# Patient Record
Sex: MALE | Race: WHITE | Employment: OTHER | URBAN - METROPOLITAN AREA
[De-identification: names, ages, dates, MRNs, and addresses within clinical notes are randomized per-mention and may not be internally consistent; named-entity substitution may affect disease eponyms.]

---

## 2015-05-04 LAB — EJECTION FRACTION: 50

## 2017-09-26 ENCOUNTER — PRE VISIT (OUTPATIENT)
Dept: UROLOGY | Facility: CLINIC | Age: 82
End: 2017-09-26

## 2017-10-04 ENCOUNTER — PRE VISIT (OUTPATIENT)
Dept: UROLOGY | Facility: CLINIC | Age: 82
End: 2017-10-04

## 2017-10-04 ASSESSMENT — ENCOUNTER SYMPTOMS
DOUBLE VISION: 0
RECTAL BLEEDING: 0
VOMITING: 0
BOWEL INCONTINENCE: 0
BLOOD IN STOOL: 0
SKIN CHANGES: 0
EYE REDNESS: 0
NAIL CHANGES: 0
JAUNDICE: 0
EYE IRRITATION: 1
CONSTIPATION: 1
DYSURIA: 0
ABDOMINAL PAIN: 0
DIARRHEA: 0
POOR WOUND HEALING: 0
EYE PAIN: 0
HEMATURIA: 0
BLOATING: 0
HEARTBURN: 0
NAUSEA: 0
RECTAL PAIN: 0
EYE WATERING: 1
DIFFICULTY URINATING: 1
FLANK PAIN: 0

## 2017-10-04 NOTE — TELEPHONE ENCOUNTER
Patient with a history of incontinence and AUS coming in because his AUS isn't working like it used to. Chart reviewed and pt had surgery with Dr. Pérez in 2010. Pt will bring 24 hours worth of pads for a pad weight.

## 2017-10-09 ENCOUNTER — OFFICE VISIT (OUTPATIENT)
Dept: UROLOGY | Facility: CLINIC | Age: 82
End: 2017-10-09

## 2017-10-09 VITALS
SYSTOLIC BLOOD PRESSURE: 142 MMHG | WEIGHT: 214.9 LBS | DIASTOLIC BLOOD PRESSURE: 79 MMHG | BODY MASS INDEX: 29.11 KG/M2 | HEART RATE: 83 BPM | HEIGHT: 72 IN

## 2017-10-09 DIAGNOSIS — Z96.0 STATUS POST IMPLANTATION OF ARTIFICIAL URINARY SPHINCTER: ICD-10-CM

## 2017-10-09 DIAGNOSIS — N39.3 MALE URINARY STRESS INCONTINENCE: Primary | ICD-10-CM

## 2017-10-09 RX ORDER — LEVOTHYROXINE SODIUM 100 UG/1
100 TABLET ORAL EVERY MORNING
COMMUNITY

## 2017-10-09 RX ORDER — POTASSIUM CITRATE 15 MEQ/1
1 TABLET, EXTENDED RELEASE ORAL 2 TIMES DAILY
COMMUNITY
Start: 2016-09-28

## 2017-10-09 RX ORDER — MUPIROCIN 20 MG/G
OINTMENT TOPICAL PRN
COMMUNITY
Start: 2017-01-17 | End: 2018-03-06

## 2017-10-09 RX ORDER — BISACODYL 5 MG
15 TABLET, DELAYED RELEASE (ENTERIC COATED) ORAL
COMMUNITY

## 2017-10-09 RX ORDER — AMLODIPINE BESYLATE 5 MG/1
5 TABLET ORAL EVERY MORNING
COMMUNITY

## 2017-10-09 RX ORDER — CLOPIDOGREL BISULFATE 75 MG/1
75 TABLET ORAL EVERY MORNING
Status: ON HOLD | COMMUNITY
End: 2017-12-21

## 2017-10-09 RX ORDER — AMLODIPINE AND VALSARTAN 5; 160 MG/1; MG/1
TABLET ORAL
COMMUNITY
End: 2017-12-14

## 2017-10-09 RX ORDER — CLONAZEPAM 1 MG/1
.5-1 TABLET ORAL AT BEDTIME
COMMUNITY

## 2017-10-09 RX ORDER — ROSUVASTATIN CALCIUM 10 MG/1
10 TABLET, COATED ORAL EVERY MORNING
COMMUNITY

## 2017-10-09 RX ORDER — DOXYCYCLINE HYCLATE 100 MG/1
100 TABLET, DELAYED RELEASE ORAL 2 TIMES DAILY
COMMUNITY

## 2017-10-09 ASSESSMENT — PAIN SCALES - GENERAL: PAINLEVEL: NO PAIN (0)

## 2017-10-09 NOTE — NURSING NOTE
Chief Complaint   Patient presents with     Consult     Patient with a history of incontinence and AUS coming in because his AUS isn't working like it used to. pt had surgery with Dr. Pérez in 2010. Pt will bring 24 hours worth of pads for a pad weight.     Deyanira Ratliff

## 2017-10-09 NOTE — LETTER
10/9/2017       RE: Uday Montilla  [address] [place] [address]     Dear Colleague,    Thank you for referring your patient, Uday Montilla, to the [facility] UROLOGY AND INST FOR PROSTATE AND UROLOGIC CANCERS at [facility]. Please see a copy of my visit note below.    NEW PATIENT FOR MALE URINARY INCONTINENCE    Name: Uday Montilla    MRN: [id number]   YOB: 1935                 Chief Complaint:   Urinary Incontinence          History of Present Illness:   Mr. Uday Montilla is a 82 year old male 7 years s/p AUS for urinary incontinence after radical prostatectomy and external beam radiation therapy presenting today for urinary incontinence. Incontinence is mixed. He manages the incontinence with 3-4 large #6 pads per day. Patient does not need to void by Crede or straining. He is not dry at night. He can voluntarily interrupt his stream when urinating in a toilet. Activities which exacerbate incontinence include golf. There is not a history of bladder neck contracture. His expectations for incontinence after treatment with us are: used to wear 1 pad a day after initial surgery.    Last 1.5 years, he reports persistent leaking throughout the day. Number of pads has increased to 3-4 up from approximately 1 a day. Even when he is sitting down the patient will leak a large enough amount of urine to fill a pad. Does not feel the urge to go to the bathroom at that time.    01/2010 Procedure: Transparent perineal placement of an  artificial urinary sphincter (4-cm cuff and 61-79 cm of water pressure-regulating balloon)    Questionnaires:    IIQ-7: 1) 1; 2) 3; 3) 3; 4) 1; 5) 3; 6) 1; 7) 3; Total) 15, 5  CATHLEEN: 1)4; 2)3, 3)4, 4)0, 5)2, 6)1, 7)3; 8)4; 9)3, 10)3; Total Severity) 21; Total Bother)  6         Past Medical History:   Hypertension  Hyperlipidemia  Diabetes Mellitus Type 2  Hypothroidism         Past Surgical  History:   Radical prostatectomy  External beam radiation  01/2010 AUS          Social History:     Social History   Substance Use Topics     Smoking status: Not on file     Smokeless tobacco: Not on file     Alcohol use Not on file            Family History:   No family history on file.         Allergies:    No Known Allergies           Medications:     Current Outpatient Prescriptions   Medication     amLODIPine (NORVASC) 5 MG tablet     bisacodyl (DULCOLAX) 5 MG EC tablet     clonazePAM (KLONOPIN) 1 MG tablet     rosuvastatin (CRESTOR) 10 MG tablet     potassium citrate 15 MEQ (1620 MG) TBCR     mupirocin (BACTROBAN) 2 % ointment     metFORMIN (GLUCOPHAGE) 500 MG tablet     levothyroxine (SYNTHROID/LEVOTHROID) 100 MCG tablet     Doxycycline Hyclate 100 MG TBEC     clopidogrel (PLAVIX) 75 MG tablet     Amlodipine Besylate-Valsartan 5-160 MG TABS     No current facility-administered medications for this visit.               Review of Systems:    ROS: 14 point ROS neg other than the symptoms noted above in the HPI.          Physical Exam:   /79  Pulse 83  Ht 1.829 m (6')  Wt 97.5 kg (214 lb 14.4 oz)  BMI 29.15 kg/m2  Estimated body mass index is 29.15 kg/(m^2) as calculated from the following:    Height as of this encounter: 1.829 m (6').    Weight as of this encounter: 97.5 kg (214 lb 14.4 oz).  General: age-appropriate appearing male in NAD. Overweight body habitus.  HEENT: Head AT/NC, EOMI, CN Grossly intact  Resp: no respiratory distress, lung sounds clear.  CV: heart rate regular, S1, S2.  Lymph: No cervical, supraclavicular or axillary lymphadenopathy  Back: bony spine is non-tender, flanks are nontender  Abdomen: not obese, soft, non-distended, non-tender. No organomegaly  : Uncircumcised male; AUS pump is present in the scrotum.  Rectal exam: Deferred  LE: no edema.   Neuro: grossly intact  Motor: excellent strength throughout  Skin: clear of rashes or ecchymoses.        Labs:    All laboratory  data reviewed with patient        Office Studies:           Imaging:    All imaging reviewed with patient.        Outside records:    I spent 10 minutes reviewing outside records.         Assessment and Plan:   82 year old male with urinary incontinence s/p radical prostatectomy and external beam radiation with AUS in place 2010. Plan is for cystoscopy.    Explained to the patient that at 8 years only 50% of patients dry with an AUS device. We discussed that it could be a device malfunction or more likely suburethral atrophy. He states that he would like to have a revision if necessary. The patient would not like to continue observation because his symptoms are causing a significant burden on his quality of life.    No orders of the defined types were placed in this encounter.      F/U: cystoscopy    Patient was seen and examined with Dr. Beto Eastman MS4  As the medical student, I acted as a scribe for this note. All portions of the documented history and physical were personally performed by Dr. Pérez as the attending physician.     =======================================================  As the attending surgeon I, Blane Pérez, interviewed and examined the patient. The plan was developed between me and the patient. My findings and plan are as stated by the resident.    Blane Pérez MD

## 2017-10-09 NOTE — PROGRESS NOTES
NEW PATIENT FOR MALE URINARY INCONTINENCE    Name: Uday Montilla    MRN: [id number]   YOB: 1935                 Chief Complaint:   Urinary Incontinence          History of Present Illness:   Mr. Uday Montilla is a 82 year old male 7 years s/p AUS for urinary incontinence after radical prostatectomy and external beam radiation therapy presenting today for urinary incontinence. Incontinence is mixed. He manages the incontinence with 3-4 large #6 pads per day. Patient does not need to void by Crede or straining. He is not dry at night. He can voluntarily interrupt his stream when urinating in a toilet. Activities which exacerbate incontinence include golf. There is not a history of bladder neck contracture. His expectations for incontinence after treatment with us are: used to wear 1 pad a day after initial surgery.    Last 1.5 years, he reports persistent leaking throughout the day. Number of pads has increased to 3-4 up from approximately 1 a day. Even when he is sitting down the patient will leak a large enough amount of urine to fill a pad. Does not feel the urge to go to the bathroom at that time.    01/2010 Procedure: Transparent perineal placement of an  artificial urinary sphincter (4-cm cuff and 61-79 cm of water pressure-regulating balloon)    Questionnaires:    IIQ-7: 1) 1; 2) 3; 3) 3; 4) 1; 5) 3; 6) 1; 7) 3; Total) 15, 5  CATHLEEN: 1)4; 2)3, 3)4, 4)0, 5)2, 6)1, 7)3; 8)4; 9)3, 10)3; Total Severity) 21; Total Bother)  6         Past Medical History:   Hypertension  Hyperlipidemia  Diabetes Mellitus Type 2  Hypothroidism         Past Surgical History:   Radical prostatectomy  External beam radiation  01/2010 AUS          Social History:     Social History   Substance Use Topics     Smoking status: Not on file     Smokeless tobacco: Not on file     Alcohol use Not on file            Family History:   No family history on file.         Allergies:    No Known Allergies            Medications:     Current Outpatient Prescriptions   Medication     amLODIPine (NORVASC) 5 MG tablet     bisacodyl (DULCOLAX) 5 MG EC tablet     clonazePAM (KLONOPIN) 1 MG tablet     rosuvastatin (CRESTOR) 10 MG tablet     potassium citrate 15 MEQ (1620 MG) TBCR     mupirocin (BACTROBAN) 2 % ointment     metFORMIN (GLUCOPHAGE) 500 MG tablet     levothyroxine (SYNTHROID/LEVOTHROID) 100 MCG tablet     Doxycycline Hyclate 100 MG TBEC     clopidogrel (PLAVIX) 75 MG tablet     Amlodipine Besylate-Valsartan 5-160 MG TABS     No current facility-administered medications for this visit.               Review of Systems:    ROS: 14 point ROS neg other than the symptoms noted above in the HPI.          Physical Exam:   /79  Pulse 83  Ht 1.829 m (6')  Wt 97.5 kg (214 lb 14.4 oz)  BMI 29.15 kg/m2  Estimated body mass index is 29.15 kg/(m^2) as calculated from the following:    Height as of this encounter: 1.829 m (6').    Weight as of this encounter: 97.5 kg (214 lb 14.4 oz).  General: age-appropriate appearing male in NAD. Overweight body habitus.  HEENT: Head AT/NC, EOMI, CN Grossly intact  Resp: no respiratory distress, lung sounds clear.  CV: heart rate regular, S1, S2.  Lymph: No cervical, supraclavicular or axillary lymphadenopathy  Back: bony spine is non-tender, flanks are nontender  Abdomen: not obese, soft, non-distended, non-tender. No organomegaly  : Uncircumcised male; AUS pump is present in the scrotum.  Rectal exam: Deferred  LE: no edema.   Neuro: grossly intact  Motor: excellent strength throughout  Skin: clear of rashes or ecchymoses.        Labs:    All laboratory data reviewed with patient        Office Studies:           Imaging:    All imaging reviewed with patient.        Outside records:    I spent 10 minutes reviewing outside records.         Assessment and Plan:   82 year old male with urinary incontinence s/p radical prostatectomy and external beam radiation with AUS in place 2010. Plan is  for cystoscopy.    Explained to the patient that at 8 years only 50% of patients dry with an AUS device. We discussed that it could be a device malfunction or more likely suburethral atrophy. He states that he would like to have a revision if necessary. The patient would not like to continue observation because his symptoms are causing a significant burden on his quality of life.    No orders of the defined types were placed in this encounter.      F/U: cystoscopy    Patient was seen and examined with Dr. Beto Eastman MS4  As the medical student, I acted as a scribe for this note. All portions of the documented history and physical were personally performed by Dr. Pérez as the attending physician.     =======================================================  As the attending surgeon I, Blane Pérez, interviewed and examined the patient. The plan was developed between me and the patient. My findings and plan are as stated by the resident.    Blane Pérez MD         Answers for HPI/ROS submitted by the patient on 10/4/2017   General Symptoms: No  Skin Symptoms: Yes  HENT Symptoms: No  EYE SYMPTOMS: Yes  HEART SYMPTOMS: No  LUNG SYMPTOMS: No  INTESTINAL SYMPTOMS: Yes  URINARY SYMPTOMS: Yes  REPRODUCTIVE SYMPTOMS: Yes  SKELETAL SYMPTOMS: No  BLOOD SYMPTOMS: No  NERVOUS SYSTEM SYMPTOMS: No  MENTAL HEALTH SYMPTOMS: No  Changes in hair: No  Changes in moles/birth marks: No  Itching: No  Rashes: No  Changes in nails: No  Acne: Yes  Change in facial hair: No  Warts: No  Non-healing sores: No  Scarring: No  Flaking of skin: No  Color changes of hands/feet in cold : No  Sun sensitivity: Yes  Skin thickening: No  Eye pain: No  Vision loss: No  Dry eyes: Yes  Watery eyes: Yes  Eye bulging: No  Double vision: No  Flashing of lights: No  Spots: No  Floaters: No  Redness: No  Crossed eyes: No  Tunnel Vision: No  Yellowing of eyes: No  Eye irritation: Yes  Heart burn or indigestion: No  Nausea: No  Vomiting:  No  Abdominal pain: No  Bloating: No  Constipation: Yes  Diarrhea: No  Blood in stool: No  Black stools: No  Rectal or Anal pain: No  Fecal incontinence: No  Rectal bleeding: No  Yellowing of skin or eyes: No  Vomit with blood: No  Change in stools: No  Hemorrhoids: No  Trouble holding urine or incontinence: Yes  Pain or burning: No  Trouble starting or stopping: Yes  Increased frequency of urination: No  Blood in urine: No  Decreased frequency of urination: No  Frequent nighttime urination: Yes  Flank pain: No  Difficulty emptying bladder: Yes  Scrotal pain or swelling: No  Erectile dysfunction: Yes  Penile discharge: Yes  Genital ulcers: No  Reduced libido: No

## 2017-10-09 NOTE — MR AVS SNAPSHOT
After Visit Summary   10/9/2017    Uday Montilla    MRN: [id number]           Patient Information     Date Of Birth          1935        Visit Information        Provider Department      10/9/2017 7:00 AM Blane Pérez MD [facility] Urology and [facility] for Prostate and Urologic Cancers        Care Instructions    Return for a cystoscopy.    It was a pleasure meeting with you today.  Thank you for allowing me and my team the privilege of caring for you today.  YOU are the reason we are here, and I truly hope we provided you with the excellent service you deserve.  Please let us know if there is anything else we can do for you so that we can be sure you are leaving completely satisfied with your care experience.                  Follow-ups after your visit        Your next 10 appointments already scheduled     Nov 20, 2017  7:30 AM CST   (Arrive by 7:15 AM)   Cystoscopy with Blane Pérez MD   [facility] Urology and [facility] for Prostate and Urologic Cancers ([facility] [place])    [address] [place] [id number] [phone]              Who to contact     Please call your clinic at [phone] to:    Ask questions about your health    Make or cancel appointments    Discuss your medicines    Learn about your test results    Speak to your doctor   If you have compliments or concerns about an experience at your clinic, or if you wish to file a complaint, please contact [facility] Patient Relations at [phone] or email us at [email or web address]         Additional Information About Your Visit        MyChart Information     [employer] gives you secure access to your electronic health record. If you see a primary care provider, you can also send messages to your care team and make appointments. If you have questions, please call your primary care clinic.  If you do not have a primary care provider, please  call [phone] and they will assist you.      [employer] is an electronic gateway that provides easy, online access to your medical records. With [employer], you can request a clinic appointment, read your test results, renew a prescription or communicate with your care team.     To access your existing account, please contact your [facility] or call [phone] for assistance.        Care EveryWhere ID     This is your Care EveryWhere ID. This could be used by other organizations to access your [facility] medical records  [id number]        Your Vitals Were     Pulse Height BMI (Body Mass Index)             83 1.829 m (6') 29.15 kg/m2          Blood Pressure from Last 3 Encounters:   10/09/17 142/79    Weight from Last 3 Encounters:   10/09/17 97.5 kg (214 lb 14.4 oz)              Today, you had the following     No orders found for display       Primary Care Provider    None Specified       No primary provider on file.        Equal Access to Services     GUS SANDY : Hadii lala perdomoo Sobonnie, waaxda luqadaha, qaybta kaalmada adeanya, elaine ibrahim . So [facility] [phone].    ATENCIÓN: Si habla español, tiene a alvarez disposición servicios gratuitos de asistencia lingüística. Phurehan al [phone].    We comply with applicable federal civil rights laws and [place] laws. We do not discriminate on the basis of race, color, national origin, age, disability, sex, sexual orientation, or gender identity.            Thank you!     Thank you for choosing [facility] UROLOGY AND [facility] FOR PROSTATE AND UROLOGIC CANCERS  for your care. Our goal is always to provide you with excellent care. Hearing back from our patients is one way we can continue to improve our services. Please take a few minutes to complete the written survey that you may receive in the mail after your visit with us. Thank you!             Your Updated Medication List - Protect others around you: Learn  how to safely use, store and throw away your medicines at www.disposemymeds.org.          This list is accurate as of: 10/9/17  8:08 AM.  Always use your most recent med list.                   Brand Name Dispense Instructions for use Diagnosis    amLODIPine 5 MG tablet    NORVASC     Take 5 mg by mouth        Amlodipine Besylate-Valsartan 5-160 MG Tabs           bisacodyl 5 MG EC tablet           clonazePAM 1 MG tablet    klonoPIN     Take 0.5-1 mg by mouth        clopidogrel 75 MG tablet    PLAVIX          Doxycycline Hyclate 100 MG Tbec           levothyroxine 100 MCG tablet    SYNTHROID/LEVOTHROID     Take 100 mcg by mouth        metFORMIN 500 MG tablet    GLUCOPHAGE          mupirocin 2 % ointment    BACTROBAN          potassium citrate 15 MEQ (1620 MG) Tbcr           rosuvastatin 10 MG tablet    CRESTOR

## 2017-11-13 ENCOUNTER — PRE VISIT (OUTPATIENT)
Dept: UROLOGY | Facility: CLINIC | Age: 82
End: 2017-11-13

## 2017-11-13 NOTE — TELEPHONE ENCOUNTER
Patient with incontinence coming in for a cystoscopy to evaluate urethra for an AUS. Chart reviewed and all records available. No need for a call.

## 2017-11-20 ENCOUNTER — OFFICE VISIT (OUTPATIENT)
Dept: UROLOGY | Facility: CLINIC | Age: 82
End: 2017-11-20

## 2017-11-20 ENCOUNTER — ALLIED HEALTH/NURSE VISIT (OUTPATIENT)
Dept: UROLOGY | Facility: CLINIC | Age: 82
End: 2017-11-20

## 2017-11-20 VITALS
HEART RATE: 54 BPM | BODY MASS INDEX: 29.85 KG/M2 | HEIGHT: 72 IN | SYSTOLIC BLOOD PRESSURE: 146 MMHG | WEIGHT: 220.4 LBS | DIASTOLIC BLOOD PRESSURE: 79 MMHG

## 2017-11-20 DIAGNOSIS — N39.3 MALE URINARY STRESS INCONTINENCE: Primary | ICD-10-CM

## 2017-11-20 ASSESSMENT — PAIN SCALES - GENERAL
PAINLEVEL: NO PAIN (0)
PAINLEVEL: NO PAIN (0)

## 2017-11-20 NOTE — NURSING NOTE
Chief Complaint   Patient presents with     Cystoscopy     Patient with incontinence coming in for a cystoscopy to evaluate urethra for an AUS.      Deyanira Ratliff

## 2017-11-20 NOTE — LETTER
11/20/2017     RE: Uday Montilla  [address] [place] [address]     Dear Colleague,    Thank you for referring your patient, Uday Montilla, to the [facility] UROLOGY AND INST FOR PROSTATE AND UROLOGIC CANCERS at [facility]. Please see a copy of my visit note below.    PRE-PROCEDURE DIAGNOSIS: male stress urinary incontinence   POST-PROCEDURE DIAGNOSIS: same  PROCEDURE: Cystoscopy  HISTORY: Uday Montilla is a 82 year old male with RP+EBRT, AUS (4cm cuff) in 2010 who was dry for 6 years and now has 1.5 years of return of incontinence.  REVIEW OF OFFICE STUDIES (e.g., uroflow or PVR): none  DESCRIPTION OF PROCEDURE: After informed consent was obtained, the patient was brought to the procedure room where he was placed in the supine position with all pressure points well padded.  The penis and scrotum were prepped and draped in a sterile fashion. A flexible cystoscope was introduced through a well-lubricated urethra. Anterior urethra strictures were absent.   The AUS cuff coapted 80-90%. The cuff was cycled and there was no e/o erosion. There was a 2cm segment of urethral proximal to the cuff.   The native urinary sphincter was very weak.  The prostate demonstrated removal.  Bladder neck was open.   Bladder signififcant for  the following:      Diverticuli: absent      Cellules: absent      Trabeculation: absent      Tumors: absent      Stones: absent  The flexible cystoscope was removed and the findings were described to the patient.   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 Plan for removal and replacement of all components. Pump is currently on left but he would be fine with it on right. Will have to scope intra-op after exposing the urethra to decide on best location -- more proximal or distal. Will do this at [place] OR b/c he is over 81 y/o and may have to stay o/n due to increased pain with removal and  replacement.  He understands the risks to include but not be limited to bleeding, infection, penile or scrotal pain/numbness, AUS infection, cuff erosion, urinary retention, sub-suff atrophy, persistent incontinence and need for additional procedures. He understands there is a 25-40% 10-year revision rate. He understands I consult for AMS. He wishes to proceed.     Again, thank you for allowing me to participate in the care of your patient.      Sincerely,    Blane Pérez MD

## 2017-11-20 NOTE — NURSING NOTE
Invasive Procedure Safety Checklist:    Procedure:     Action: Complete sections and checkboxes as appropriate.    Pre-procedure:  1. Patient ID Verified with 2 identifiers (Kaleigh and  or MRN) : YES    2. Procedure and site verified with patient/designee (when able) : YES    3. Accurate consent documentation in medical record : YES    4. H&P (or appropriate assessment) documented in medical record : YES  H&P must be up to 30 days prior to procedure an updated within 24 hours of                 Procedure as applicable.     5. Relevant diagnostic and radiology test results appropriately labeled and displayed as applicable : YES    6. Blood products, implants, devices, and/or special equipment available for the procedure as applicable : YES    7. Procedure site(s) marked with provider initials [Exclusions: N/A] : NO    8. Marking not required. Reason : Yes  Procedure does not require site marking    Time Out:     Time-Out performed immediately prior to starting procedure, including verbal and active participation of all team members addressing: YES      1. Correct patient identity.  2. Confirmed that the correct side and site are marked.  3. An accurate procedure to be done.  4. Agreement on the procedure to be done.  5. Correct patient position.  6. Relevant images and results are properly labeled and appropriately displayed.  7. The need to administer antibiotics or fluids for irrigation purposes during the procedure as applicable.  8. Safety precautions based on patient history or medication use.    During Procedure: Verification of correct person, site, and procedure occurs any time the responsibility for care of the patient is transferred to another member of the care team.

## 2017-11-20 NOTE — PROGRESS NOTES
PRE-PROCEDURE DIAGNOSIS: male stress urinary incontinence   POST-PROCEDURE DIAGNOSIS: same  PROCEDURE: Cystoscopy  HISTORY: Uday Montilla is a 82 year old male with RP+EBRT, AUS (4cm cuff) in 2010 who was dry for 6 years and now has 1.5 years of return of incontinence.  REVIEW OF OFFICE STUDIES (e.g., uroflow or PVR): none  DESCRIPTION OF PROCEDURE: After informed consent was obtained, the patient was brought to the procedure room where he was placed in the supine position with all pressure points well padded.  The penis and scrotum were prepped and draped in a sterile fashion. A flexible cystoscope was introduced through a well-lubricated urethra. Anterior urethra strictures were absent.   The AUS cuff coapted 80-90%. The cuff was cycled and there was no e/o erosion. There was a 2cm segment of urethral proximal to the cuff.   The native urinary sphincter was very weak.  The prostate demonstrated removal.  Bladder neck was open.   Bladder signififcant for  the following:      Diverticuli: absent      Cellules: absent      Trabeculation: absent      Tumors: absent      Stones: absent  The flexible cystoscope was removed and the findings were described to the patient.   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 Plan for removal and replacement of all components. Pump is currently on left but he would be fine with it on right. Will have to scope intra-op after exposing the urethra to decide on best location -- more proximal or distal. Will do this at [place] b/c he is over 81 y/o and may have to stay o/n due to increased pain with removal and replacement.  He understands the risks to include but not be limited to bleeding, infection, penile or scrotal pain/numbness, AUS infection, cuff erosion, urinary retention, sub-suff atrophy, persistent incontinence and need for additional procedures. He understands there is a 25-40% 10-year revision rate. He understands I consult for  AMS. He wishes to proceed.

## 2017-11-20 NOTE — MR AVS SNAPSHOT
"              After Visit Summary   11/20/2017    Uday Montilla    MRN: [id number]           Patient Information     Date Of Birth          1935        Visit Information        Provider Department      11/20/2017 7:30 AM Blane Pérez MD [facility] Urology and [facility] for Prostate and Urologic Cancers        Today's Diagnoses     Male urinary stress incontinence    -  1      Care Instructions      AFTER YOUR CYSTOSCOPY        You have just completed a cystoscopy, or \"cysto\", which allowed your physician to learn more about your bladder (or to remove a stent placed after surgery). We suggest that you continue to avoid caffeine, fruit juice, and alcohol for the next 24 hours, however, you are encouraged to return to your normal activities.         A few things that are considered normal after your cystoscopy:     * Small amount of bleeding (or spotting) that clears within the next 24 hours     * Slight burning sensation with urination     * Sensation to of needing to avoid more frequently     * The feeling of \"air\" in your urine     * Mild discomfort that is relieved with Tylenol        Please contact our office promptly if you:     * Develop a fever above 101 degrees     * Are unable to urinate     * Develop bright red blood that does not stop     * Severe pain or swelling         Please contact our office with any concerns or questions @[facility].          Follow-ups after your visit        Follow-up notes from your care team     Return in 12 months (on 11/20/2018).      Who to contact     Please call your clinic at [phone] to:    Ask questions about your health    Make or cancel appointments    Discuss your medicines    Learn about your test results    Speak to your doctor   If you have compliments or concerns about an experience at your clinic, or if you wish to file a complaint, please contact [facility] Patient Relations at [phone] or email us at " [email or web address]         Additional Information About Your Visit        Yovigoharmiguel Information     Yovigohart gives you secure access to your electronic health record. If you see a primary care provider, you can also send messages to your care team and make appointments. If you have questions, please call your primary care clinic.  If you do not have a primary care provider, please call [phone] and they will assist you.      [employer]PP is an electronic gateway that provides easy, online access to your medical records. With REPP, you can request a clinic appointment, read your test results, renew a prescription or communicate with your care team.     To access your existing account, please contact your [facility] or call [phone] for assistance.        Care EveryWhere ID     This is your Care EveryWhere ID. This could be used by other organizations to access your [place] medical records  [id number]        Your Vitals Were     Pulse Height BMI (Body Mass Index)             54 1.829 m (6') 29.89 kg/m2          Blood Pressure from Last 3 Encounters:   11/20/17 146/79   10/09/17 142/79    Weight from Last 3 Encounters:   11/20/17 100 kg (220 lb 6.4 oz)   10/09/17 97.5 kg (214 lb 14.4 oz)              We Performed the Following     CYSTOURETHROSCOPY     Maranda-Operative Worksheet  (Urology General)        Primary Care Provider    None Specified       No primary provider on file.        Equal Access to Services     GUS SANDY : Hadradha Leavitt, cordell best, qapatsyta elaine stockton . So [facility] [phone].    ATENCIÓN: Si habla español, tiene a alvarez disposición servicios gratuitos de asistencia lingüística. Johnnie al [phone].    We comply with applicable federal civil rights laws and [place] laws. We do not discriminate on the basis of race, color, national origin, age, disability, sex, sexual orientation,  or gender identity.            Thank you!     Thank you for choosing [facility] UROLOGY AND [facility] [[email or web address]] FOR PROSTATE AND UROLOGIC CANCERS  for your care. Our goal is always to provide you with excellent care. Hearing back from our patients is one way we can continue to improve our services. Please take a few minutes to complete the written survey that you may receive in the mail after your visit with us. Thank you!             Your Updated Medication List - Protect others around you: Learn how to safely use, store and throw away your medicines at www.disposemymeds.org.          This list is accurate as of: 11/20/17  7:39 AM.  Always use your most recent med list.                   Brand Name Dispense Instructions for use Diagnosis    amLODIPine 5 MG tablet    NORVASC     Take 5 mg by mouth        Amlodipine Besylate-Valsartan 5-160 MG Tabs           bisacodyl 5 MG EC tablet           clonazePAM 1 MG tablet    klonoPIN     Take 0.5-1 mg by mouth        clopidogrel 75 MG tablet    PLAVIX          Doxycycline Hyclate 100 MG Tbec           levothyroxine 100 MCG tablet    SYNTHROID/LEVOTHROID     Take 100 mcg by mouth        metFORMIN 500 MG tablet    GLUCOPHAGE          mupirocin 2 % ointment    BACTROBAN          potassium citrate 15 MEQ (1620 MG) Tbcr           rosuvastatin 10 MG tablet    CRESTOR

## 2017-11-20 NOTE — PROGRESS NOTES
Pre Op Teaching Flowsheet       Pre and Post op Patient Education  Relevant Diagnosis:  Male stress urinary incontinence  Teaching Topic:  Pre and post op teaching for removal and replacement of artifical urinary sphincter, cystoscopy  Person Involved in teaching:  dUay Montilla      Motivation Level:  Asks Questions: Yes  Eager to Learn:  Yes  Cooperative: Yes  Receptive (willing/able to accept information):  Yes  Patient demonstrates understanding of the following:  Date and time of surgery:  12.21.17 at 1245  Location of surgery: [address] [place] [address]  History and Physical and any other testing necessary prior to surgery: Yes  Required time line for completion of History and Physical and any pre-op testing: Yes    NPO Guidelines: NPO per Anesthesia Guidelines    Patient demonstrates understanding of the following:  Pre-op bowel prep: no, not needed  Pre-op showering/scrub information with Hibiclens Soap: Yes  Medications to take the day of surgery:  Per PCP  Blood thinner medications discussed and when to stop (if applicable):  Yes  Diabetes medication management (if applicable):  N/A  Discussed pain control after surgery: pain scale, pain medications and pain management techniques  Infection Prevention: Patient demonstrates understanding of the following:  Patient instructed on hand hygiene:  Yes  Surgical procedure site care taught: Yes  Signs and symptoms of infection taught:  Yes  Wound care will be taught at the time of discharge.  Central venous catheter care will be taught at the time of discharge (if applicable).    Post-op follow-up:  Discussed how to contact the hospital, nurse, and clinic scheduling staff if necessary.    Instructional materials used/given/mailed:  [place] Surgery Booklet, post op teaching sheet, Map, Soap, and arrival/location information.    Surgical instructions given to patient in clinic: Yes.    Instructional Materials given:  Before your surgery packet , Medications to  avoid before surgery , Showering or Bathing instructions before surgery  and What to expect after surgery    Post-op appointment/testing scheduled per MD orders: Yes, 1.8.18 at 1400 with Dr Pérez    Total time with patient: 10 minutes    Brittany Mayer, RN-BC, BSN  Urology Care Coordinator

## 2017-11-20 NOTE — MR AVS SNAPSHOT
After Visit Summary   11/20/2017    Uday Montilla    MRN: [id number]           Patient Information     Date Of Birth          1935        Visit Information        Provider Department      11/20/2017 8:00 AM Nurse, [facility] Urology and Inst for Prostate and Urologic Cancers        Today's Diagnoses     Male urinary stress incontinence    -  1       Follow-ups after your visit        Your next 10 appointments already scheduled     Dec 21, 2017   Procedure with Blane Péerz MD   [facility], [place], Same Day Surgery (--)    [address] [facility] [facility] [id number] [phone]            Jan 08, 2018  2:00 PM CST   (Arrive by 1:45 PM)   Post-Op with Blane Pérez MD   [facility] Urology and Inst for Prostate and Urologic Cancers ([facility])    [address] [place] [id number] [phone]              Who to contact     Please call your clinic at [phone] to:    Ask questions about your health    Make or cancel appointments    Discuss your medicines    Learn about your test results    Speak to your doctor   If you have compliments or concerns about an experience at your clinic, or if you wish to file a complaint, please contact [facility] Patient Relations at [phone] or email us at [email or web address]         Additional Information About Your Visit        MyChart Information     [employer] gives you secure access to your electronic health record. If you see a primary care provider, you can also send messages to your care team and make appointments. If you have questions, please call your primary care clinic.  If you do not have a primary care provider, please call [phone] and they will assist you.      "[employer] is an electronic gateway that provides easy, online access to your medical records. With "[employer] you can request a clinic appointment, read your test  results, renew a prescription or communicate with your care team.     To access your existing account, please contact your [facility] or call [phone] for assistance.        Care EveryWhere ID     This is your Care EveryWhere ID. This could be used by other organizations to access your [place] medical records  [id number]         Blood Pressure from Last 3 Encounters:   11/20/17 146/79   10/09/17 142/79    Weight from Last 3 Encounters:   11/20/17 100 kg (220 lb 6.4 oz)   10/09/17 97.5 kg (214 lb 14.4 oz)              Today, you had the following     No orders found for display       Primary Care Provider Fax #    Provider Not In System [phone]                Equal Access to Services     GUS SANDY : Hadii lala perdomoo Dagmar, walissyda luteraadaha, qaybta kaalmada adeflakitoyada, elaine ibrahim . So [facility] [phone].    ATENCIÓN: Si habla español, tiene a alvarez disposición servicios gratuitos de asistencia lingüística. Llame al [phone].    We comply with applicable federal civil rights laws and [place] laws. We do not discriminate on the basis of race, color, national origin, age, disability, sex, sexual orientation, or gender identity.            Thank you!     Thank you for choosing [facility] UROLOGY AND [facility] FOR PROSTATE AND UROLOGIC CANCERS  for your care. Our goal is always to provide you with excellent care. Hearing back from our patients is one way we can continue to improve our services. Please take a few minutes to complete the written survey that you may receive in the mail after your visit with us. Thank you!             Your Updated Medication List - Protect others around you: Learn how to safely use, store and throw away your medicines at www.disposemymeds.org.          This list is accurate as of: 11/20/17  8:51 AM.  Always use your most recent med list.                   Brand Name Dispense Instructions for use Diagnosis    amLODIPine  5 MG tablet    NORVASC     Take 5 mg by mouth        Amlodipine Besylate-Valsartan 5-160 MG Tabs           bisacodyl 5 MG EC tablet           clonazePAM 1 MG tablet    klonoPIN     Take 0.5-1 mg by mouth        clopidogrel 75 MG tablet    PLAVIX          Doxycycline Hyclate 100 MG Tbec           levothyroxine 100 MCG tablet    SYNTHROID/LEVOTHROID     Take 100 mcg by mouth        metFORMIN 500 MG tablet    GLUCOPHAGE          mupirocin 2 % ointment    BACTROBAN          potassium citrate 15 MEQ (1620 MG) Tbcr           rosuvastatin 10 MG tablet    CRESTOR

## 2017-12-08 ENCOUNTER — CARE COORDINATION (OUTPATIENT)
Dept: UROLOGY | Facility: CLINIC | Age: 82
End: 2017-12-08

## 2017-12-08 DIAGNOSIS — N39.3 MALE URINARY STRESS INCONTINENCE: Primary | ICD-10-CM

## 2017-12-08 NOTE — PROGRESS NOTES
Upcoming surgical procedure with Dr Blane Pérez on 12.21.17 at 1300, check in at 1100.   Surgery at ([facility] or [place]): [place]  Patient is having a removal and replacement of artifical urinary sphincter, cystoscopy  Pre-op physical completed: YES. Date-12.14.17.  PAC: YES. Date-12.14.17.  Bowel Prep: No, not needed  Urine culture completed: YES. Date-12.14.17 >100,000 colonies/mL   mixed urogenital genny .  Post-operative appointment needed: YES. Date-1.8.18 at 1400 with Dr Pérez.    12.08.17 - Left message for patient.  12.15.17 - Spoke with patient. All questions answered.  Patient verbalized understanding. No further questions.       Brittany Mayer RN-BC, BSN  Care Coordinator - Reconstructive Urology  [phone]

## 2017-12-14 ENCOUNTER — OFFICE VISIT (OUTPATIENT)
Dept: SURGERY | Facility: CLINIC | Age: 82
End: 2017-12-14
Payer: COMMERCIAL

## 2017-12-14 ENCOUNTER — ALLIED HEALTH/NURSE VISIT (OUTPATIENT)
Dept: SURGERY | Facility: CLINIC | Age: 82
End: 2017-12-14
Payer: COMMERCIAL

## 2017-12-14 ENCOUNTER — ANESTHESIA EVENT (OUTPATIENT)
Dept: SURGERY | Facility: CLINIC | Age: 82
DRG: 664 | End: 2017-12-14
Payer: MEDICARE

## 2017-12-14 ENCOUNTER — APPOINTMENT (OUTPATIENT)
Dept: SURGERY | Facility: CLINIC | Age: 82
End: 2017-12-14
Payer: COMMERCIAL

## 2017-12-14 VITALS
WEIGHT: 220.8 LBS | RESPIRATION RATE: 16 BRPM | HEART RATE: 50 BPM | SYSTOLIC BLOOD PRESSURE: 165 MMHG | TEMPERATURE: 97.7 F | OXYGEN SATURATION: 99 % | HEIGHT: 72 IN | BODY MASS INDEX: 29.91 KG/M2 | DIASTOLIC BLOOD PRESSURE: 75 MMHG

## 2017-12-14 DIAGNOSIS — N39.3 MALE URINARY STRESS INCONTINENCE: ICD-10-CM

## 2017-12-14 DIAGNOSIS — Z01.818 PRE-OP EXAM: ICD-10-CM

## 2017-12-14 DIAGNOSIS — Z01.818 PRE-OP EXAM: Primary | ICD-10-CM

## 2017-12-14 LAB
ANION GAP SERPL CALCULATED.3IONS-SCNC: 5 MMOL/L (ref 3–14)
BUN SERPL-MCNC: 28 MG/DL (ref 7–30)
CALCIUM SERPL-MCNC: 8.7 MG/DL (ref 8.5–10.1)
CHLORIDE SERPL-SCNC: 105 MMOL/L (ref 94–109)
CO2 SERPL-SCNC: 27 MMOL/L (ref 20–32)
CREAT SERPL-MCNC: 1.36 MG/DL (ref 0.66–1.25)
GFR SERPL CREATININE-BSD FRML MDRD: 50 ML/MIN/1.7M2
GLUCOSE SERPL-MCNC: 94 MG/DL (ref 70–99)
POTASSIUM SERPL-SCNC: 4.6 MMOL/L (ref 3.4–5.3)
SODIUM SERPL-SCNC: 137 MMOL/L (ref 133–144)

## 2017-12-14 NOTE — PATIENT INSTRUCTIONS
Preparing for Your Surgery      Name:  Uday Montilla   MRN:  [id number]   :  1935   Today's Date:  2017     Arriving for surgery:  Surgery date:  17  Arrival time:  9:25 am  Please come to:       [facility] [address]  [address] [place]  [address]    -   parking is available in front of the hospital from 5:15 am to 8:00 pm    -  Stop at the Information Desk in the lobby    -   Inform the information person that you are here for surgery. An escort to 3c will be provided. If you would not like an escort, please proceed to 3C on the 3rd floor. [phone]     What can I eat or drink?  -  You may have solid food or milk products until 8 hours prior to your surgery. (12:00 midnight)  -  You may have water, apple juice or 7up/Sprite until 2 hours prior to your surgery. (9:25 am)    Which medicines can I take?       -  Hold Plavix for 4 days  -  Do NOT take these medications in the morning, the day of surgery:  Metformin      Bisacodyl      Potasium Citrate      Doxycycline      -  Please take these medications the day of surgery:  Amlodipine      Levothyroxine      Crestor          How do I prepare myself?  -  Take two showers: one the night before surgery; and one the morning of surgery.         Use Scrubcare or Hibiclens to wash from neck down.  You may use your own shampoo and conditioner. No other hair products.   -  Do NOT use lotion, powder, deodorant, or antiperspirant the day of your surgery.  -  Do NOT wear any makeup, fingernail polish or jewelry.  -  Do not bring your own medications to the hospital, except for inhalers and eye drops.  -  Bring your ID and insurance card.    Questions or Concerns:  If you have questions or concerns, please call the  Preoperative Assessment Center, Monday-Friday 7AM-7PM:  [phone]          AFTER YOUR SURGERY  Breathing exercises   Breathing exercises help you recover faster. Take deep breaths and  let the air out slowly. This will:     Help you wake up after surgery.    Help prevent complications like pneumonia.  Preventing complications will help you go home sooner.   We may give you a breathing device (incentive spirometer) to encourage you to breathe deeply.   Nausea and vomiting   You may feel sick to your stomach after surgery; if so, let your nurse know.    Pain control:  After surgery, you may have pain. Our goal is to help you manage your pain. Pain medicine will help you feel comfortable enough to do activities that will help you heal.  These activities may include breathing exercises, walking and physical therapy.   To help your health care team treat your pain we will ask: 1) If you have pain  2) where it is located 3) describe your pain in your words  Methods of pain control include medications given by mouth, vein or by nerve block for some surgeries.  Sequential Compression Device (SCD) or Pneumo Boots:  You may need to wear SCD S on your legs or feet. These are wraps connected to a machine that pumps in air and releases it. The repeated pumping helps prevent blood clots from forming.

## 2017-12-14 NOTE — MR AVS SNAPSHOT
After Visit Summary   2017    Uday Montilla    MRN: [id number]           Patient Information     Date Of Birth          1935        Visit Information        Provider Department      2017 10:00 AM Rn, [facility] Preoperative Assessment Center        Care Instructions    Preparing for Your Surgery      Name:  Uday Montilla   MRN:  [id number]   :  1935   Today's Date:  2017     Arriving for surgery:  Surgery date:  17  Arrival time:  9:25 am  Please come to:       [facility] [address]  [address] [place]  [address]    -  [employer] parking is available in front of the hospital from 5:15 am to 8:00 pm    -  Stop at the Information Desk in the lobby    -   Inform the information person that you are here for surgery. An escort to 3c will be provided. If you would not like an escort, please proceed to 3C on the 3rd floor. [phone]     What can I eat or drink?  -  You may have solid food or milk products until 8 hours prior to your surgery. (12:00 midnight)  -  You may have water, apple juice or 7up/Sprite until 2 hours prior to your surgery. (9:25 am)    Which medicines can I take?       -  Follow Plavix instructions per Cardiology  -  Do NOT take these medications in the morning, the day of surgery:  Metformin      Bisacodyl      Potasium Citrate      Doxycycline      -  Please take these medications the day of surgery:  Amlodipine      Clonazepam      Levothyroxine      Crestor          How do I prepare myself?  -  Take two showers: one the night before surgery; and one the morning of surgery.         Use Scrubcare or Hibiclens to wash from neck down.  You may use your own shampoo and conditioner. No other hair products.   -  Do NOT use lotion, powder, deodorant, or antiperspirant the day of your surgery.  -  Do NOT wear any makeup, fingernail polish or jewelry.  -  Do not bring your own medications to the  hospital, except for inhalers and eye drops.  -  Bring your ID and insurance card.    Questions or Concerns:  If you have questions or concerns, please call the  Preoperative Assessment Center, Monday-Friday 7AM-7PM:  [phone]          AFTER YOUR SURGERY  Breathing exercises   Breathing exercises help you recover faster. Take deep breaths and let the air out slowly. This will:     Help you wake up after surgery.    Help prevent complications like pneumonia.  Preventing complications will help you go home sooner.   We may give you a breathing device (incentive spirometer) to encourage you to breathe deeply.   Nausea and vomiting   You may feel sick to your stomach after surgery; if so, let your nurse know.    Pain control:  After surgery, you may have pain. Our goal is to help you manage your pain. Pain medicine will help you feel comfortable enough to do activities that will help you heal.  These activities may include breathing exercises, walking and physical therapy.   To help your health care team treat your pain we will ask: 1) If you have pain  2) where it is located 3) describe your pain in your words  Methods of pain control include medications given by mouth, vein or by nerve block for some surgeries.  Sequential Compression Device (SCD) or Pneumo Boots:  You may need to wear SCD S on your legs or feet. These are wraps connected to a machine that pumps in air and releases it. The repeated pumping helps prevent blood clots from forming.                   Follow-ups after your visit        Your next 10 appointments already scheduled     Dec 14, 2017 10:00 AM CST   (Arrive by 9:45 AM)   PAC RN ASSESSMENT with Sabrina Pac Rn   [facility] Preoperative [facility] Center ([facility])    [address]  4th [facility] [id number] [phone]            Dec 14, 2017 10:40 AM CST   (Arrive by 10:25 AM)   PAC Anesthesia Consult with Sabrina Pac Anesthesiologist   [facility]  Preoperative Assessment Center ([facility])    [address] [place] [address]  4th [facility] [id number] [phone]            Dec 14, 2017 11:00 AM CST   LAB with  LAB   [facility] ([facility])    [address] [place] [id number] [phone]           Please do not eat 10-12 hours before your appointment if you are coming in fasting for labs on lipids, cholesterol, or glucose (sugar). This does not apply to pregnant women. Water, hot tea and black coffee (with nothing added) are okay. Do not drink other fluids, diet soda or chew gum.            Dec 21, 2017   Procedure with Blane Pérez MD   [facility] [place], Same Day Surgery (--)    [address] [facility] [id number] [phone]            Jan 08, 2018  2:00 PM CST   (Arrive by 1:45 PM)   Post-Op with Blane Pérez MD   [facility] for Prostate and Urologic Cancers ([facility])    [address] [place] [id number] [phone]              Who to contact     Please call your clinic at [phone] to:    Ask questions about your health    Make or cancel appointments    Discuss your medicines    Learn about your test results    Speak to your doctor   If you have compliments or concerns about an experience at your clinic, or if you wish to file a complaint, please contact [facility] Patient Relations at [phone] or email us at [email or web address]         Additional Information About Your Visit        MyChart Information     [employer] gives you secure access to your electronic health record. If you see a primary care provider, you can also send messages to your care team and make appointments. If you have questions, please call your primary care clinic.  If you do not have a primary care provider, please call [phone] and they will assist you.       [employer] is an electronic gateway that provides easy, online access to your medical records. With [employer], you can request a clinic appointment, read your test results, renew a prescription or communicate with your care team.     To access your existing account, please contact your [facility] or call [phone] for assistance.        Care EveryWhere ID     This is your Care EveryWhere ID. This could be used by other organizations to access your [facility] medical records  [id number]         Blood Pressure from Last 3 Encounters:   12/14/17 165/75   11/20/17 146/79   10/09/17 142/79    Weight from Last 3 Encounters:   12/14/17 100.2 kg (220 lb 12.8 oz)   11/20/17 100 kg (220 lb 6.4 oz)   10/09/17 97.5 kg (214 lb 14.4 oz)              Today, you had the following     No orders found for display       Primary Care Provider Fax #    Provider Not In System [phone]                Equal Access to Services     [facility] HospitalCHELLE : Hadii lala ku hadasho Soomaali, waaxda luqadaha, qaybta kaalmada adeegyada, elaine ibrahim . So [facility] [phone].    ATENCIÓN: Si habla español, tiene a alvarez disposición servicios gratuitos de asistencia lingüística. Llame al [phone].    We comply with applicable federal civil rights laws and [place] laws. We do not discriminate on the basis of race, color, national origin, age, disability, sex, sexual orientation, or gender identity.            Thank you!     Thank you for choosing [facility] PREOPERATIVE [facility]ESSMENT [facility]  for your care. Our goal is always to provide you with excellent care. Hearing back from our patients is one way we can continue to improve our services. Please take a few minutes to complete the written survey that you may receive in the mail after your visit with us. Thank you!             Your Updated Medication List - Protect others around you: Learn how to safely use, store and throw away your medicines at  www.disposemymeds.org.          This list is accurate as of: 12/14/17  9:55 AM.  Always use your most recent med list.                   Brand Name Dispense Instructions for use Diagnosis    amLODIPine 5 MG tablet    NORVASC     Take 5 mg by mouth every morning        bisacodyl 5 MG EC tablet      Take 15 mg by mouth every 3 days        clonazePAM 1 MG tablet    klonoPIN     Take 0.5-1 mg by mouth        clopidogrel 75 MG tablet    PLAVIX     Take 75 mg by mouth every morning        Doxycycline Hyclate 100 MG Tbec      Take 100 mg by mouth 2 times daily        levothyroxine 100 MCG tablet    SYNTHROID/LEVOTHROID     Take 100 mcg by mouth every morning        metFORMIN 500 MG tablet    GLUCOPHAGE     Take 500 mg by mouth 2 times daily (with meals)        mupirocin 2 % ointment    BACTROBAN     Apply topically as needed        potassium citrate 15 MEQ (1620 MG) Tbcr      Take 1 tablet by mouth 2 times daily        rosuvastatin 10 MG tablet    CRESTOR     Take 10 mg by mouth every morning

## 2017-12-14 NOTE — ANESTHESIA PREPROCEDURE EVALUATION
"  Anesthesia Evaluation     . Pt has had prior anesthetic.     No history of anesthetic complications          ROS/MED HX    ENT/Pulmonary:     (+)JI risk factors hypertension, , . .    Neurologic:  - neg neurologic ROS     Cardiovascular:     (+) hypertension--CAD, --stent,8/28/2015  2 Bare Metal Stent .. Taking blood thinners Pt has received instructions: Instructions Given to patient: Per Sr. Bhupendra Noel's office patient is to stop Plavix 4 days prior to procedure. . . :. . Previous cardiac testing date:results:Stress Testdate:5/2017 results: date: results: date: results:          METS/Exercise Tolerance:  >4 METS   Hematologic: Comments: 6-7 years ago \"clotting\" in his bladder unable to urinate. Was on aspirin and plavix at that time. Was instructed to stop taking aspirin at that time and has not had and issue since.    (+) History of blood clots -      Musculoskeletal: Comment: Right hand  (+) fracture upper extremity: Other (comment), , -       GI/Hepatic:  - neg GI/hepatic ROS       Renal/Genitourinary: Comment: Recent history of Kidney stones    (+) chronic renal disease,       Endo:     (+) thyroid problem hypothyroidism, .      Psychiatric:  - neg psychiatric ROS       Infectious Disease:  - neg infectious disease ROS       Malignancy:   (+) Malignancy History of Prostate  Prostate CA Remission status post Surgery and Radiation,         Other:                     Physical Exam  Normal systems: cardiovascular    Airway   Mallampati: I  TM distance: >3 FB  Neck ROM: full    Dental   (+) missing    Cardiovascular   Rhythm and rate: regular and normal      Pulmonary    breath sounds clear to auscultation               PAC Discussion and Assessment    ASA Classification: 3  Case is suitable for: [facility]  Anesthetic techniques and relevant risks discussed: GA  Invasive monitoring and risk discussed:   Types:   Possibility and Risk of blood transfusion discussed:   NPO instructions given:   Additional " anesthetic preparation and risks discussed:   Needs early admission to pre-op area:   Other:     PAC Resident/NP Anesthesia Assessment:  Uday Montilla is an 82 year old male scheduled for  Removal And Replacement Of Artificial Urinary Sphincter, Cystoscopy  on 12/21/2017  by Dr. Pérez in treatment of urinary incontinence.  PAC referral for risk assessment and optimization for anesthesia with comorbid conditions of  the following:    Pre-operative considerations:  1.  Cardiac:  Functional status- METS >4 walks 2 miles 6 days/week.  low risk surgery with 0.4% (RCRI #) risk of major adverse cardiac event. Denies chest pain or shortness of breath        CAD- s/p 2 bare metal stents; Circ/ RCA 2015. Will hold plavix 4 days prior to procedure per Cardiologist        Recent stress test 5/2018 negative         EKG:       HTN: managed on amlodipine       HL- on statins  2.  Pulm:  Airway feasible.  JI risk: intermediate 3/8 . No pulmonary history or symptoms reported  3.  GI:  Risk of PONV score = 1.  If > 2, anti-emetic intervention recommended.  4. Renal: Kidney stones 2016  5. ENDO: DM2 on metformin  6. VTE risk: 1.8%      Patient is optimized and is acceptable candidate for the proposed procedure.  No further diagnostic evaluation is needed.     Patient also evaluated by Dr. Morton. See recommendations below.     For further details of assessment, testing, and physical exam please see H and P completed on same date.      Reviewed and Signed by PAC Mid-Level Provider/Resident  Mid-Level Provider/Resident: Lacey AUSTIN CNP  Date: 12/14/2017  Time:     Attending Anesthesiologist Anesthesia Assessment:  I saw the patient and discussed with the DRE as above.  Patient currently medically optimized for the proposed procedure.   Final anesthetic plan and recommendations to be made by the attending anesthesiologist on the day of surgery.   Patient with significant cardiac history for CAD with last stents placed 2015.  Still remains on plavix, but not aspirin. Will touch base with cardiologist about what they prefer to do with his plavix perioperatively. Currently able to achieve 4 METS.      Reviewed and Signed by PAC Anesthesiologist  Anesthesiologist: BAO  Date: 12/14/17  Time:   Pass/Fail: Pass  Disposition:     PAC Pharmacist Assessment:        Pharmacist:   Date:   Time:      Anesthesia Plan      History & Physical Review  History and physical reviewed and following examination; no interval change.    ASA Status:  3 .    NPO Status:  > 8 hours    Plan for General, ETT and LMA with Intravenous induction. Maintenance will be Balanced.    PONV prophylaxis:  Ondansetron (or other 5HT-3) and Dexamethasone or Solumedrol  I have examined the patient and reviewed the medical record.  Plan GA with ETT/LMA with routine monitors    Min Garcia MD      Postoperative Care  Postoperative pain management:  IV analgesics.      Consents  Anesthetic plan, risks, benefits and alternatives discussed with:  Patient..                          .

## 2017-12-14 NOTE — H&P
Pre-Operative H & P     CC:  Preoperative exam to assess for increased cardiopulmonary risk while undergoing surgery and anesthesia.    Date of Encounter: 12/14/2017  Primary Care Physician:  System, Provider Not In    HPI  Uday Montilla is a 82 year old male who presents for pre-operative H & P in preparation for  Removal And Replacement Of Artificial Urinary Sphincter, Cystoscopy  with Dr. Pérez on 12/21/2017 at [facility].     Mr. Uday Montilla is a 82 year old male 7 years s/p AUS for urinary incontinence after radical prostatectomy and external beam radiation therapy. The patient reports over the past 1.5 years increased persistent urinary incontinence. Activities exacerbate his incontinence and he states he is now using 3-4 large pads per day when originally after the initial surgery he was only using 1 pad daily. The patient had a cystoscopy  on 11/20/2017 which revealed sub-cuff atrophy. He has been following with Dr. Mike and the above procedure has been recommended for further management.  He also has a history CAD s/p 2 Stents 2015, HTN, HL, DM 2, Kidney stones and Hypothyroidism.    History is obtained from the patient.     Past Medical History  Past Medical History:   Diagnosis Date     Coronary artery disease 2008     Diabetes (H)     Type II     Hyperlipidemia      Hypertension      Hypothyroid      Incontinence of urine 2010     Kidney stones 2016     Prostate cancer (H) 2010       Past Surgical History  Past Surgical History:   Procedure Laterality Date     ANGIOGRAM  2008,2012,2015    Most recent stents circ and RCA 2015 bare metal stents     AS REMV PROSTATE,RETROPUB,RAD,TOT NODES  2000     AS TOTAL HIP ARTHROPLASTY Left 2013     AS TOTAL HIP ARTHROPLASTY Right 2010     BACK SURGERY  2006    Lower spine     CHOLECYSTECTOMY       prosthetic sphincter  2010       Hx of Blood transfusions/reactions: no     Hx of abnormal bleeding or  anti-platelet use: yes Plavix: holding plavix per cardiology x4 days prior to DOS    Menstrual history: No LMP for male patient.:     Steroid use in the last year: n0    Personal or FH with difficulty with Anesthesia:  no    Prior to Admission Medications  Current Outpatient Prescriptions   Medication Sig Dispense Refill     amLODIPine (NORVASC) 5 MG tablet Take 5 mg by mouth every morning        bisacodyl (DULCOLAX) 5 MG EC tablet Take 15 mg by mouth every 3 days        clonazePAM (KLONOPIN) 1 MG tablet Take 0.5-1 mg by mouth       rosuvastatin (CRESTOR) 10 MG tablet Take 10 mg by mouth every morning        potassium citrate 15 MEQ (1620 MG) TBCR Take 1 tablet by mouth 2 times daily        mupirocin (BACTROBAN) 2 % ointment Apply topically as needed        metFORMIN (GLUCOPHAGE) 500 MG tablet Take 500 mg by mouth 2 times daily (with meals)        levothyroxine (SYNTHROID/LEVOTHROID) 100 MCG tablet Take 100 mcg by mouth every morning        Doxycycline Hyclate 100 MG TBEC Take 100 mg by mouth 2 times daily        clopidogrel (PLAVIX) 75 MG tablet Take 75 mg by mouth every morning          Allergies  No Known Allergies    Social History  Social History     Social History     Marital status:      Spouse name: Brisa     Number of children: 5     Years of education: N/A     Occupational History     Not on file.     Social History Main Topics     Smoking status: Former Smoker     Types: Cigars     Quit date: 12/14/1994     Smokeless tobacco: Never Used     Alcohol use Not on file      Comment: social beer and wine     Drug use: Not on file     Sexual activity: Not on file     Other Topics Concern     Not on file     Social History Narrative       Family History  Family History   Problem Relation Age of Onset     Heart Failure Mother 80     CEREBROVASCULAR DISEASE Father 80         ROS/MED HX  The complete review of systems is negative other than noted in the HPI or here.       ENT/Pulmonary:     (+)JI risk  "factors hypertension, , . .    Neurologic:  - neg neurologic ROS     Cardiovascular:     (+) hypertension--CAD, --stent,8/28/2015  2 Bare Metal Stent .. Taking blood thinners Pt has received instructions: Instructions Given to patient: Per Sr. Bhupendra Noel's office patient is to stop Plavix 4 days prior to procedure. . . :. . Previous cardiac testing date:results:Stress Testdate:5/2017 results: date: results: date: results:          METS/Exercise Tolerance:  >4 METS   Hematologic: Comments: 6-7 years ago \"clotting\" in his bladder unable to urinate. Was on aspirin and plavix at that time. Was instructed to stop taking aspirin at that time and has not had and issue since.    (+) History of blood clots -      Musculoskeletal: Comment: Right hand  (+) fracture upper extremity: Other (comment), , -       GI/Hepatic:  - neg GI/hepatic ROS       Renal/Genitourinary: Comment: Recent history of Kidney stones    (+) chronic renal disease,       Endo:     (+) thyroid problem hypothyroidism, .      Psychiatric:  - neg psychiatric ROS       Infectious Disease:  - neg infectious disease ROS       Malignancy:   (+) Malignancy History of Prostate  Prostate CA Remission status post Surgery and Radiation,         Other:                 The complete review of systems is negative other than noted in the HPI or here.   Temp: 97.7  F (36.5  C) Temp src: Oral BP: 165/75 Pulse: 50   Resp: 16 SpO2: 99 %         220 lbs 12.8 oz  6' 0\"   Body mass index is 29.95 kg/(m^2).       Physical Exam  Constitutional: Awake, alert, cooperative, no apparent distress, and appears stated age.  Eyes: Pupils equal, round and reactive to light, extra ocular muscles intact, sclera clear, conjunctiva normal.  HENT: Normocephalic, oral pharynx with moist mucus membranes, good dentition. No goiter appreciated.   Respiratory: Clear to auscultation bilaterally, no crackles or wheezing.  Cardiovascular: Regular rate and rhythm, normal S1 and S2, and no murmur " noted.  Carotids +2, no bruits. No edema. Palpable pulses to radial  DP and PT arteries.   GI: Normal bowel sounds, soft, non-distended, non-tender, no masses palpated, no hepatosplenomegaly.  Surgical scars: healed  Lymph/Hematologic: No cervical lymphadenopathy and no supraclavicular lymphadenopathy.  Genitourinary:  deferred  Skin: Warm and dry.  No rashes at anticipated surgical site.   Musculoskeletal: Full ROM of neck. There is no redness, warmth, mild ankle edema noted RT>LT; no swelling of the exposed joints. Gross motor strength is normal.    Neurologic: Awake, alert, oriented to name, place and time. Cranial nerves II-XII are grossly intact. Gait is normal.   Neuropsychiatric: Calm, cooperative. Normal affect.     Labs: (personally reviewed)  Last Basic Metabolic Panel:  Lab Results   Component Value Date     12/14/2017      Lab Results   Component Value Date    POTASSIUM 4.6 12/14/2017     Lab Results   Component Value Date    CHLORIDE 105 12/14/2017     Lab Results   Component Value Date    MIKI 8.7 12/14/2017     Lab Results   Component Value Date    CO2 27 12/14/2017     Lab Results   Component Value Date    BUN 28 12/14/2017     Lab Results   Component Value Date    CR 1.36 12/14/2017     Lab Results   Component Value Date    GLC 94 12/14/2017       EKG: Personally reviewed but formal cardiology read pending:     Stress test:  Recent stress test May 2017 ( requested records)      Outside records reviewed from: care eveywhere    ASSESSMENT and PLAN  Uday Montilla is an 82 year old male scheduled for  Removal And Replacement Of Artificial Urinary Sphincter, Cystoscopy  on 12/21/2017  by Dr. Pérez in treatment of urinary incontinence.  PAC referral for risk assessment and optimization for anesthesia with comorbid conditions of  the following:    Pre-operative considerations:  1.  Cardiac:  Functional status- METS >4 walks 2 miles 6 days/week.  low risk surgery with 0.4% (RCRI #) risk of major  adverse cardiac event. Denies chest pain or shortness of breath        CAD- s/p 2 bare metal stents; Circ/ RCA 2015. Will hold plavix 4 days prior to procedure per Cardiologist        Recent stress test 5/2018 normal according to the patient. ( placed call to cardiologist for records)         HTN: managed on amlodipine       HL- on statins  2.  Pulm:  Airway feasible.  JI risk: intermediate 3/8 . No pulmonary history or symptoms reported  3.  GI:  Risk of PONV score = 1.  If > 2, anti-emetic intervention recommended.  4. Renal: Kidney stones 2016 ( todays creatinine 1.36 ) Creatinine 5/19/2017 1.26. Has a history of fluctuation in his creatinine levels.    5. ENDO: DM2 on metformin  6. VTE risk: 1.8%  Patient was discussed with Dr Morton.    GEOVANNA Sidhu CNP  Preoperative Assessment Center  [facility]  Clinic and Surgery Center  Phone: [phone]  Fax: [phone]

## 2017-12-15 LAB
BACTERIA SPEC CULT: NORMAL
SPECIMEN SOURCE: NORMAL

## 2017-12-21 ENCOUNTER — ANESTHESIA (OUTPATIENT)
Dept: SURGERY | Facility: CLINIC | Age: 82
DRG: 664 | End: 2017-12-21
Payer: MEDICARE

## 2017-12-21 ENCOUNTER — HOSPITAL ENCOUNTER (INPATIENT)
Facility: CLINIC | Age: 82
LOS: 1 days | Discharge: HOME | DRG: 664 | End: 2017-12-22
Attending: UROLOGY | Admitting: UROLOGY
Payer: MEDICARE

## 2017-12-21 DIAGNOSIS — N39.3 MALE URINARY STRESS INCONTINENCE: Primary | ICD-10-CM

## 2017-12-21 LAB
CREAT SERPL-MCNC: 1.14 MG/DL (ref 0.66–1.25)
GFR SERPL CREATININE-BSD FRML MDRD: 61 ML/MIN/1.7M2
GLUCOSE BLDC GLUCOMTR-MCNC: 104 MG/DL (ref 70–99)
GLUCOSE BLDC GLUCOMTR-MCNC: 109 MG/DL (ref 70–99)
GLUCOSE BLDC GLUCOMTR-MCNC: 124 MG/DL (ref 70–99)
POTASSIUM SERPL-SCNC: 4.1 MMOL/L (ref 3.4–5.3)

## 2017-12-21 PROCEDURE — 25000128 H RX IP 250 OP 636: Performed by: UROLOGY

## 2017-12-21 PROCEDURE — 88300 SURGICAL PATH GROSS: CPT | Performed by: UROLOGY

## 2017-12-21 PROCEDURE — 25000128 H RX IP 250 OP 636: Performed by: NURSE ANESTHETIST, CERTIFIED REGISTERED

## 2017-12-21 PROCEDURE — 71000014 ZZH RECOVERY PHASE 1 LEVEL 2 FIRST HR: Performed by: UROLOGY

## 2017-12-21 PROCEDURE — 25000125 ZZHC RX 250: Performed by: UROLOGY

## 2017-12-21 PROCEDURE — 25000132 ZZH RX MED GY IP 250 OP 250 PS 637: Mod: GY | Performed by: ANESTHESIOLOGY

## 2017-12-21 PROCEDURE — 0TJB8ZZ INSPECTION OF BLADDER, VIA NATURAL OR ARTIFICIAL OPENING ENDOSCOPIC: ICD-10-PCS | Performed by: UROLOGY

## 2017-12-21 PROCEDURE — 36415 COLL VENOUS BLD VENIPUNCTURE: CPT | Performed by: ANESTHESIOLOGY

## 2017-12-21 PROCEDURE — 84132 ASSAY OF SERUM POTASSIUM: CPT | Performed by: ANESTHESIOLOGY

## 2017-12-21 PROCEDURE — 12000001 ZZH R&B MED SURG/OB UMMC

## 2017-12-21 PROCEDURE — 71000015 ZZH RECOVERY PHASE 1 LEVEL 2 EA ADDTL HR: Performed by: UROLOGY

## 2017-12-21 PROCEDURE — 37000008 ZZH ANESTHESIA TECHNICAL FEE, 1ST 30 MIN: Performed by: UROLOGY

## 2017-12-21 PROCEDURE — 25000125 ZZHC RX 250: Performed by: ANESTHESIOLOGY

## 2017-12-21 PROCEDURE — 0TPD0LZ REMOVAL OF ARTIFICIAL SPHINCTER FROM URETHRA, OPEN APPROACH: ICD-10-PCS | Performed by: UROLOGY

## 2017-12-21 PROCEDURE — 93010 ELECTROCARDIOGRAM REPORT: CPT | Performed by: INTERNAL MEDICINE

## 2017-12-21 PROCEDURE — 37000009 ZZH ANESTHESIA TECHNICAL FEE, EACH ADDTL 15 MIN: Performed by: UROLOGY

## 2017-12-21 PROCEDURE — 40000065 ZZH STATISTIC EKG NON-CHARGEABLE

## 2017-12-21 PROCEDURE — 27211024 ZZHC OR SUPPLY OTHER OPNP: Performed by: UROLOGY

## 2017-12-21 PROCEDURE — A9270 NON-COVERED ITEM OR SERVICE: HCPCS | Mod: GY | Performed by: ANESTHESIOLOGY

## 2017-12-21 PROCEDURE — C1815 PROS, URINARY SPH, IMP: HCPCS | Performed by: UROLOGY

## 2017-12-21 PROCEDURE — 25000128 H RX IP 250 OP 636: Performed by: ANESTHESIOLOGY

## 2017-12-21 PROCEDURE — 36000061 ZZH SURGERY LEVEL 3 W FLUORO 1ST 30 MIN - UMMC: Performed by: UROLOGY

## 2017-12-21 PROCEDURE — 00000146 ZZHCL STATISTIC GLUCOSE BY METER IP

## 2017-12-21 PROCEDURE — 25000132 ZZH RX MED GY IP 250 OP 250 PS 637: Mod: GY | Performed by: UROLOGY

## 2017-12-21 PROCEDURE — 82565 ASSAY OF CREATININE: CPT | Performed by: ANESTHESIOLOGY

## 2017-12-21 PROCEDURE — 25000125 ZZHC RX 250: Performed by: NURSE ANESTHETIST, CERTIFIED REGISTERED

## 2017-12-21 PROCEDURE — 0THD0LZ INSERTION OF ARTIFICIAL SPHINCTER INTO URETHRA, OPEN APPROACH: ICD-10-PCS | Performed by: UROLOGY

## 2017-12-21 PROCEDURE — 25000565 ZZH ISOFLURANE, EA 15 MIN: Performed by: UROLOGY

## 2017-12-21 PROCEDURE — 27810169 ZZH OR IMPLANT GENERAL: Performed by: UROLOGY

## 2017-12-21 PROCEDURE — 40000170 ZZH STATISTIC PRE-PROCEDURE ASSESSMENT II: Performed by: UROLOGY

## 2017-12-21 PROCEDURE — 27210794 ZZH OR GENERAL SUPPLY STERILE: Performed by: UROLOGY

## 2017-12-21 PROCEDURE — A9270 NON-COVERED ITEM OR SERVICE: HCPCS | Mod: GY | Performed by: UROLOGY

## 2017-12-21 PROCEDURE — 36000059 ZZH SURGERY LEVEL 3 EA 15 ADDTL MIN UMMC: Performed by: UROLOGY

## 2017-12-21 DEVICE — IMPLANTABLE DEVICE: Type: IMPLANTABLE DEVICE | Site: ABDOMEN | Status: FUNCTIONAL

## 2017-12-21 DEVICE — KIT ACCESSORY UCS AMS 800 720066-01: Type: IMPLANTABLE DEVICE | Site: SCROTUM | Status: FUNCTIONAL

## 2017-12-21 DEVICE — IMP URINARY SPHINCTER AMS 800 PUMP SYSTEM 72404127: Type: IMPLANTABLE DEVICE | Site: SCROTUM | Status: FUNCTIONAL

## 2017-12-21 DEVICE — IMP URINARY SPHINCTER BALLOON AMS 61-70CM 72400024: Type: IMPLANTABLE DEVICE | Site: ABDOMEN | Status: FUNCTIONAL

## 2017-12-21 RX ORDER — ONDANSETRON 2 MG/ML
4 INJECTION INTRAMUSCULAR; INTRAVENOUS EVERY 6 HOURS PRN
Status: DISCONTINUED | OUTPATIENT
Start: 2017-12-21 | End: 2017-12-22 | Stop reason: HOSPADM

## 2017-12-21 RX ORDER — HYDROMORPHONE HCL/0.9% NACL/PF 0.2MG/0.2
0.2 SYRINGE (ML) INTRAVENOUS EVERY 4 HOURS PRN
Status: DISCONTINUED | OUTPATIENT
Start: 2017-12-21 | End: 2017-12-22 | Stop reason: HOSPADM

## 2017-12-21 RX ORDER — FENTANYL CITRATE 50 UG/ML
25-50 INJECTION, SOLUTION INTRAMUSCULAR; INTRAVENOUS
Status: DISCONTINUED | OUTPATIENT
Start: 2017-12-21 | End: 2017-12-21

## 2017-12-21 RX ORDER — LABETALOL HYDROCHLORIDE 5 MG/ML
10 INJECTION, SOLUTION INTRAVENOUS
Status: DISCONTINUED | OUTPATIENT
Start: 2017-12-21 | End: 2017-12-21

## 2017-12-21 RX ORDER — GABAPENTIN 100 MG/1
100 CAPSULE ORAL ONCE
Status: COMPLETED | OUTPATIENT
Start: 2017-12-21 | End: 2017-12-21

## 2017-12-21 RX ORDER — LIDOCAINE HYDROCHLORIDE 20 MG/ML
INJECTION, SOLUTION INFILTRATION; PERINEURAL PRN
Status: DISCONTINUED | OUTPATIENT
Start: 2017-12-21 | End: 2017-12-21

## 2017-12-21 RX ORDER — MEPERIDINE HYDROCHLORIDE 50 MG/ML
12.5 INJECTION INTRAMUSCULAR; INTRAVENOUS; SUBCUTANEOUS
Status: DISCONTINUED | OUTPATIENT
Start: 2017-12-21 | End: 2017-12-21

## 2017-12-21 RX ORDER — GLYCOPYRROLATE 0.2 MG/ML
0.2 INJECTION, SOLUTION INTRAMUSCULAR; INTRAVENOUS ONCE
Status: COMPLETED | OUTPATIENT
Start: 2017-12-21 | End: 2017-12-21

## 2017-12-21 RX ORDER — AMLODIPINE BESYLATE 5 MG/1
5 TABLET ORAL EVERY MORNING
Status: DISCONTINUED | OUTPATIENT
Start: 2017-12-22 | End: 2017-12-21

## 2017-12-21 RX ORDER — PROPOFOL 10 MG/ML
INJECTION, EMULSION INTRAVENOUS PRN
Status: DISCONTINUED | OUTPATIENT
Start: 2017-12-21 | End: 2017-12-21

## 2017-12-21 RX ORDER — ONDANSETRON 2 MG/ML
INJECTION INTRAMUSCULAR; INTRAVENOUS PRN
Status: DISCONTINUED | OUTPATIENT
Start: 2017-12-21 | End: 2017-12-21

## 2017-12-21 RX ORDER — SODIUM CHLORIDE, SODIUM LACTATE, POTASSIUM CHLORIDE, CALCIUM CHLORIDE 600; 310; 30; 20 MG/100ML; MG/100ML; MG/100ML; MG/100ML
INJECTION, SOLUTION INTRAVENOUS CONTINUOUS
Status: DISCONTINUED | OUTPATIENT
Start: 2017-12-21 | End: 2017-12-21

## 2017-12-21 RX ORDER — ACETAMINOPHEN 325 MG/1
975 TABLET ORAL ONCE
Status: COMPLETED | OUTPATIENT
Start: 2017-12-21 | End: 2017-12-21

## 2017-12-21 RX ORDER — NALOXONE HYDROCHLORIDE 0.4 MG/ML
.1-.4 INJECTION, SOLUTION INTRAMUSCULAR; INTRAVENOUS; SUBCUTANEOUS
Status: DISCONTINUED | OUTPATIENT
Start: 2017-12-21 | End: 2017-12-22 | Stop reason: HOSPADM

## 2017-12-21 RX ORDER — CIPROFLOXACIN 500 MG/1
500 TABLET, FILM COATED ORAL
Status: DISCONTINUED | OUTPATIENT
Start: 2017-12-22 | End: 2017-12-22 | Stop reason: HOSPADM

## 2017-12-21 RX ORDER — DEXAMETHASONE SODIUM PHOSPHATE 4 MG/ML
INJECTION, SOLUTION INTRA-ARTICULAR; INTRALESIONAL; INTRAMUSCULAR; INTRAVENOUS; SOFT TISSUE PRN
Status: DISCONTINUED | OUTPATIENT
Start: 2017-12-21 | End: 2017-12-21

## 2017-12-21 RX ORDER — CIPROFLOXACIN 500 MG/1
500 TABLET, FILM COATED ORAL 2 TIMES DAILY
Qty: 10 TABLET | Refills: 0 | Status: SHIPPED | OUTPATIENT
Start: 2017-12-21 | End: 2017-12-22

## 2017-12-21 RX ORDER — GLYCOPYRROLATE 0.2 MG/ML
INJECTION, SOLUTION INTRAMUSCULAR; INTRAVENOUS PRN
Status: DISCONTINUED | OUTPATIENT
Start: 2017-12-21 | End: 2017-12-21

## 2017-12-21 RX ORDER — OXYCODONE HYDROCHLORIDE 5 MG/1
5-10 TABLET ORAL
Status: DISCONTINUED | OUTPATIENT
Start: 2017-12-21 | End: 2017-12-22 | Stop reason: HOSPADM

## 2017-12-21 RX ORDER — GINSENG 100 MG
CAPSULE ORAL 2 TIMES DAILY
Status: DISCONTINUED | OUTPATIENT
Start: 2017-12-21 | End: 2017-12-22 | Stop reason: HOSPADM

## 2017-12-21 RX ORDER — SODIUM CHLORIDE, SODIUM LACTATE, POTASSIUM CHLORIDE, CALCIUM CHLORIDE 600; 310; 30; 20 MG/100ML; MG/100ML; MG/100ML; MG/100ML
INJECTION, SOLUTION INTRAVENOUS CONTINUOUS
Status: DISCONTINUED | OUTPATIENT
Start: 2017-12-21 | End: 2017-12-21 | Stop reason: HOSPADM

## 2017-12-21 RX ORDER — AMOXICILLIN 250 MG
1 CAPSULE ORAL 2 TIMES DAILY PRN
Status: DISCONTINUED | OUTPATIENT
Start: 2017-12-21 | End: 2017-12-22 | Stop reason: HOSPADM

## 2017-12-21 RX ORDER — TOLTERODINE 4 MG/1
4 CAPSULE, EXTENDED RELEASE ORAL DAILY
Status: DISCONTINUED | OUTPATIENT
Start: 2017-12-21 | End: 2017-12-22 | Stop reason: HOSPADM

## 2017-12-21 RX ORDER — FENTANYL CITRATE 50 UG/ML
INJECTION, SOLUTION INTRAMUSCULAR; INTRAVENOUS PRN
Status: DISCONTINUED | OUTPATIENT
Start: 2017-12-21 | End: 2017-12-21

## 2017-12-21 RX ORDER — ONDANSETRON 4 MG/1
4 TABLET, ORALLY DISINTEGRATING ORAL EVERY 30 MIN PRN
Status: DISCONTINUED | OUTPATIENT
Start: 2017-12-21 | End: 2017-12-21

## 2017-12-21 RX ORDER — LIDOCAINE 40 MG/G
CREAM TOPICAL
Status: DISCONTINUED | OUTPATIENT
Start: 2017-12-21 | End: 2017-12-21 | Stop reason: HOSPADM

## 2017-12-21 RX ORDER — LEVOTHYROXINE SODIUM 100 UG/1
100 TABLET ORAL EVERY MORNING
Status: DISCONTINUED | OUTPATIENT
Start: 2017-12-22 | End: 2017-12-21

## 2017-12-21 RX ORDER — BISACODYL 5 MG
15 TABLET, DELAYED RELEASE (ENTERIC COATED) ORAL
Status: DISCONTINUED | OUTPATIENT
Start: 2017-12-21 | End: 2017-12-21

## 2017-12-21 RX ORDER — ONDANSETRON 4 MG/1
4 TABLET, ORALLY DISINTEGRATING ORAL EVERY 6 HOURS PRN
Status: DISCONTINUED | OUTPATIENT
Start: 2017-12-21 | End: 2017-12-22 | Stop reason: HOSPADM

## 2017-12-21 RX ORDER — AMOXICILLIN 250 MG
2 CAPSULE ORAL 2 TIMES DAILY PRN
Status: DISCONTINUED | OUTPATIENT
Start: 2017-12-21 | End: 2017-12-22 | Stop reason: HOSPADM

## 2017-12-21 RX ORDER — GINSENG 100 MG
CAPSULE ORAL PRN
Status: DISCONTINUED | OUTPATIENT
Start: 2017-12-21 | End: 2017-12-21

## 2017-12-21 RX ORDER — SODIUM CHLORIDE 9 MG/ML
INJECTION, SOLUTION INTRAVENOUS CONTINUOUS
Status: DISCONTINUED | OUTPATIENT
Start: 2017-12-21 | End: 2017-12-22 | Stop reason: HOSPADM

## 2017-12-21 RX ORDER — ONDANSETRON 2 MG/ML
4 INJECTION INTRAMUSCULAR; INTRAVENOUS EVERY 30 MIN PRN
Status: DISCONTINUED | OUTPATIENT
Start: 2017-12-21 | End: 2017-12-21

## 2017-12-21 RX ORDER — OXYCODONE HCL 5 MG/5 ML
5 SOLUTION, ORAL ORAL EVERY 4 HOURS PRN
Status: CANCELLED | OUTPATIENT
Start: 2017-12-21

## 2017-12-21 RX ORDER — ROSUVASTATIN CALCIUM 10 MG/1
10 TABLET, COATED ORAL EVERY MORNING
Status: DISCONTINUED | OUTPATIENT
Start: 2017-12-22 | End: 2017-12-21

## 2017-12-21 RX ORDER — NALOXONE HYDROCHLORIDE 0.4 MG/ML
.1-.4 INJECTION, SOLUTION INTRAMUSCULAR; INTRAVENOUS; SUBCUTANEOUS
Status: DISCONTINUED | OUTPATIENT
Start: 2017-12-21 | End: 2017-12-21

## 2017-12-21 RX ORDER — ACETAMINOPHEN 325 MG/1
650 TABLET ORAL EVERY 4 HOURS PRN
Status: DISCONTINUED | OUTPATIENT
Start: 2017-12-21 | End: 2017-12-22 | Stop reason: HOSPADM

## 2017-12-21 RX ADMIN — GABAPENTIN 100 MG: 100 CAPSULE ORAL at 11:43

## 2017-12-21 RX ADMIN — ONDANSETRON 4 MG: 2 INJECTION INTRAMUSCULAR; INTRAVENOUS at 13:41

## 2017-12-21 RX ADMIN — OXYCODONE HYDROCHLORIDE 5 MG: 5 TABLET ORAL at 21:22

## 2017-12-21 RX ADMIN — VANCOMYCIN HYDROCHLORIDE 1.5 G: 10 INJECTION, POWDER, LYOPHILIZED, FOR SOLUTION INTRAVENOUS at 12:00

## 2017-12-21 RX ADMIN — DEXAMETHASONE SODIUM PHOSPHATE 4 MG: 4 INJECTION, SOLUTION INTRA-ARTICULAR; INTRALESIONAL; INTRAMUSCULAR; INTRAVENOUS; SOFT TISSUE at 11:53

## 2017-12-21 RX ADMIN — ACETAMINOPHEN 975 MG: 325 TABLET, FILM COATED ORAL at 11:43

## 2017-12-21 RX ADMIN — GLYCOPYRROLATE 0.2 MG: 0.2 INJECTION, SOLUTION INTRAMUSCULAR; INTRAVENOUS at 17:14

## 2017-12-21 RX ADMIN — FENTANYL CITRATE 25 MCG: 50 INJECTION, SOLUTION INTRAMUSCULAR; INTRAVENOUS at 14:34

## 2017-12-21 RX ADMIN — FENTANYL CITRATE 50 MCG: 50 INJECTION, SOLUTION INTRAMUSCULAR; INTRAVENOUS at 13:35

## 2017-12-21 RX ADMIN — FENTANYL CITRATE 50 MCG: 50 INJECTION, SOLUTION INTRAMUSCULAR; INTRAVENOUS at 13:37

## 2017-12-21 RX ADMIN — PROPOFOL 100 MG: 10 INJECTION, EMULSION INTRAVENOUS at 11:53

## 2017-12-21 RX ADMIN — GLYCOPYRROLATE 0.2 MG: 0.2 INJECTION, SOLUTION INTRAMUSCULAR; INTRAVENOUS at 11:54

## 2017-12-21 RX ADMIN — GENTAMICIN SULFATE 390 MG: 40 INJECTION, SOLUTION INTRAMUSCULAR; INTRAVENOUS at 12:00

## 2017-12-21 RX ADMIN — FENTANYL CITRATE 25 MCG: 50 INJECTION, SOLUTION INTRAMUSCULAR; INTRAVENOUS at 14:19

## 2017-12-21 RX ADMIN — FENTANYL CITRATE 25 MCG: 50 INJECTION, SOLUTION INTRAMUSCULAR; INTRAVENOUS at 15:44

## 2017-12-21 RX ADMIN — FENTANYL CITRATE 25 MCG: 50 INJECTION, SOLUTION INTRAMUSCULAR; INTRAVENOUS at 15:51

## 2017-12-21 RX ADMIN — TOLTERODINE 4 MG: 4 CAPSULE, EXTENDED RELEASE ORAL at 20:38

## 2017-12-21 RX ADMIN — BACITRACIN: 500 OINTMENT TOPICAL at 20:38

## 2017-12-21 RX ADMIN — PROPOFOL 25 MG: 10 INJECTION, EMULSION INTRAVENOUS at 11:55

## 2017-12-21 RX ADMIN — FENTANYL CITRATE 50 MCG: 50 INJECTION, SOLUTION INTRAMUSCULAR; INTRAVENOUS at 11:53

## 2017-12-21 RX ADMIN — GLYCOPYRROLATE 0.2 MG: 0.2 INJECTION, SOLUTION INTRAMUSCULAR; INTRAVENOUS at 12:58

## 2017-12-21 RX ADMIN — GLYCOPYRROLATE 0.2 MG: 0.2 INJECTION, SOLUTION INTRAMUSCULAR; INTRAVENOUS at 12:07

## 2017-12-21 RX ADMIN — LIDOCAINE HYDROCHLORIDE 100 MG: 20 INJECTION, SOLUTION INFILTRATION; PERINEURAL at 11:54

## 2017-12-21 RX ADMIN — ACETAMINOPHEN 650 MG: 325 TABLET, FILM COATED ORAL at 20:41

## 2017-12-21 RX ADMIN — FENTANYL CITRATE 50 MCG: 50 INJECTION, SOLUTION INTRAMUSCULAR; INTRAVENOUS at 13:01

## 2017-12-21 RX ADMIN — SODIUM CHLORIDE, POTASSIUM CHLORIDE, SODIUM LACTATE AND CALCIUM CHLORIDE: 600; 310; 30; 20 INJECTION, SOLUTION INTRAVENOUS at 11:37

## 2017-12-21 RX ADMIN — SODIUM CHLORIDE: 9 INJECTION, SOLUTION INTRAVENOUS at 18:16

## 2017-12-21 RX ADMIN — GLYCOPYRROLATE 0.1 MG: 0.2 INJECTION, SOLUTION INTRAMUSCULAR; INTRAVENOUS at 13:54

## 2017-12-21 ASSESSMENT — PAIN DESCRIPTION - DESCRIPTORS
DESCRIPTORS: DULL;ACHING
DESCRIPTORS: DULL;ACHING

## 2017-12-21 NOTE — BRIEF OP NOTE
[facility], [place]    Brief Operative Note    Pre-operative diagnosis: Male Urinary Stress Incontinence   Post-operative diagnosis Same  Procedure: Procedure(s):  Removal of control pump, pressure regulating balloon and cuff.  Replacement Of Artificial Urinary Sphincter control pump and pressure regulating balloon, urethral repair, cystoscopy  - Wound Class: II-Clean Contaminated   - Wound Class: II-Clean Contaminated  Surgeon: Surgeon(s) and Role:     * Blane Pérez MD - Primary     * Alvaro Julian MD - Resident - Assisting  Anesthesia: General   Estimated blood loss: 25cc  Drains: 16Fr stanley catheter  Specimens:   ID Type Source Tests Collected by Time Destination   1 : AMS balloon, pump, and cuff Other (specify in comments) Other OR DOCUMENTATION ONLY Blane Pérez MD 12/21/2017  2:02 PM      Findings:  Urethrotomy made during procedure, closed. Decision made to not place a cuff. PRB and pump placed and ends capped.  Complications: None.

## 2017-12-21 NOTE — ANESTHESIA CARE TRANSFER NOTE
Patient: Uday Montilla    Procedure(s):  Removal of control pump, pressure regulating balloon and cuff.  Replacement Of Artificial Urinary Sphincter control pump and pressure regulating balloon, Cystoscopy  - Wound Class: II-Clean Contaminated   - Wound Class: II-Clean Contaminated    Diagnosis: Male Urinary Stress Incontinence   Diagnosis Additional Information: No value filed.    Anesthesia Type:   General, ETT, LMA     Note:  Airway :Room Air  Patient transferred to:PACU  Handoff Report: Identifed the Patient, Identified the Reponsible Provider, Reviewed the pertinent medical history, Discussed the surgical course, Reviewed Intra-OP anesthesia mangement and issues during anesthesia, Set expectations for post-procedure period and Allowed opportunity for questions and acknowledgement of understanding      Vitals: (Last set prior to Anesthesia Care Transfer)    CRNA VITALS  12/21/2017 1424 - 12/21/2017 1503      12/21/2017             Resp Rate (observed): (!)  1                Electronically Signed By: GEOVANNA Ochoa CRNA  December 21, 2017  3:03 PM

## 2017-12-21 NOTE — OR NURSING
Dr. Pérez explanted an [facility] pressure regulating balloon, control pump, and cuff.

## 2017-12-21 NOTE — IP AVS SNAPSHOT
MRN:[id number]                      After Visit Summary   12/21/2017    Uday Montilla    MRN: [id number]           Thank you!     Thank you for choosing [facility] for your care. Our goal is always to provide you with excellent care. Hearing back from our patients is one way we can continue to improve our services. Please take a few minutes to complete the written survey that you may receive in the mail after you visit with us. Thank you!        Patient Information     Date Of Birth          1935        Designated Caregiver       Most Recent Value    Caregiver    Will someone help with your care after discharge? no    Name of designated caregiver wife liam      About your hospital stay     You were admitted on:  December 21, 2017 You last received care in the:  Unit 6D Observation [facility]    You were discharged on:  December 22, 2017       Who to Call     For medical emergencies, please call 911.  For non-urgent questions about your medical care, please call your primary care provider or clinic, None  For questions related to your surgery, please call your surgery clinic        Attending Provider     Provider Specialty    Blane Pérez MD Urology       Primary Care Provider Fax #    Provider Not In System [phone]      After Care Instructions     Discharge Instructions       Activity  - No strenuous exercise for 6 weeks.  - No lifting, pushing, pulling more than 10 pounds for 6 weeks.   - Do not strain with bowel movements.  - Do not drive until you can press the brake pedal quickly and fully without pain.   - Do not operate a motor vehicle while taking narcotic pain medications.     - You are going home with the following tubes or drains: stanley catheter.  Nurse/ to write care and instructions.  Treat the catheter like an extension of your body; do not let it get caught or snagged on anything.  Leave the catheter in place until your follow up. Call the  "numbers listed above for any concerns.   - Catheter to be removed in clinic    Medications  - Some pain medications contain both tylenol (acetaminophen) and a narcotic (Norco, vicodin, percocet), do not take more than 4,000mg of Tylenol (acetaminophen) from all sources in any 24 hour period.  - Narcotics can make you constipated.  Take over the counter fiber (metamucil or benefiber) and stool softeners (miralax, docusate or senna) while taking narcotic pain medications, but stop if you develop diarrhea.  - No driving or operating machinery while taking narcotic pain medications     Follow-Up:  - Call your primary care provider to touch base regarding your recent admission.    - Call or return sooner than your regularly scheduled visit if you develop any of the following: fever (greater than 101.5), uncontrolled pain, uncontrolled nausea or vomiting, as well as increased redness, swelling, or drainage from your wound.     Phone numbers:   - Monday through Friday 8am to 4:30pm: Call [phone] with questions or to schedule or confirm appointment.    - Nights or weekends: call the after hours emergency pager - [phone] and tell the  \"I would like to page the Urology Resident on call.\"  - For emergencies, call [phone]            Discharge Instructions       Your activity upon discharge:     Pain:  There will be discomfort in the area of the surgery that should continue to get better on a daily basis.      For pain you are being prescribed oxycodone, a narcotic pain medication which should only be required for the first three to five days.  Narcotics will cause sleepiness and constipation, therefore it is best to stop or reduce them as soon as you can and switch to using acetaminophen (Tylenol) and/or ibuprofen (Advil/Motrin), taken as directed on the packaging.  Do not take more than 4,000mg of Tylenol (acetaminophen) from all sources in any 24 hour period since this can cause liver damage.      You may notice " bladder spasms which can present as sharp sudden pains in the area of bladder (just above the pubic hair) or the tip of the penis.  This is usually because the catheters irritate the bladder. You may feel like you have to urinate even though the bladder is fully drained. You will be given a prescription (Detrol LA / tolterodine) of a medication that can help with these.  It is important to take the bladder spasm medicine because occasionally these bladder spasms can get intense enough to cause you to urinate around (rather than through) the catheter, and this rush of urine can damage the urethroplasty work.    Urination:  You will go home with the Quintanilla catheter in your penis to drain your bladder until your follow-up with Dr Pérez.  This should be secured at all times to avoid tugging /trauma, especially to the penis.  This catheter will not generally restrict your activities, but you should be careful if you are driving such that they do not become a distraction.  You should apply the bacitracin antibiotic ointment (see below) to the tip of the penis 2-3 times/a day to prevent irritation.  You may notice a little blood, small amount of white discharge, or caking at the tip of the penis as well as around the suprapubic catheter. This is normal but it can irritate the catheter insertion sites, so it is best to wash this off in the shower.  It is fine to get the urethral catheter, and drain bags wet.     Activity:   - No strenuous exercise for 6 weeks but walking and stairs are fine. You may gradually return to normal activity and normal lifting over the course of 6 weeks but take care not to strain your left inner thigh incision.  Use good judgment: if something hurts then don't do it.  - You should not sit at an upright (45 degree) position for more than 10-15 minutes at a time.  All sitting should be in a reclined (30 degree) position  - You should not soak in a tub or pool until the catheter is removed but  daily showering is important for healing.  - Do not strain with bowel movements.    - Do not drive until you can press the brake pedal quickly and fully without pain.   - Do not operate a motor vehicle while taking narcotic pain medications.     Wound Care:    - You will have an incision between your scrotum and anus and also in your abdomen. These will be closed with stitches that will dissolve on their own and do not need to be removed.  You will not need any specific bandage on this area, but you may find wearing a small pad in your underwear can help protect from blood staining your underwear. It is recommended that you wear your scrotal support for the first week following surgery to help reduce swelling (please wash scrotal support if it becomes soiled).   You can shower and let the water run over your incisions but you should not scrub them with soap.    Medications:  Oxycodone - this is a narcotic pain medication which you should only need for three to five days after surgery.    Detrol LA (tolterodine) - This is a bladder spasm medicine which you should take on a daily basis.  Bacitracin Ointment - This is an antibiotic ointment which will help with discomfort from the stanley cathteter  Ciprofloxacin - This is an antibiotic pill which you will take daily to prevent infection while the catheters are in place  Pericolace (Senna/docusate) - This is a stool softener to take twice daily.  The surgery, pain medications, and bladder spasm medications can lead to constipation.  You can stop or reduce the pericolace if you are having loose stools.  Other over the counter solutions such as prune juice, miralax, fiber products, senna, and dulcolax can also be used.  If you have not had a bowel movement in 3 days start over-the-counter Milk of Magnesia taken twice daily as directed until you have a good result.   Restart plavix 7 days after surgery     Follow-up:  Please follow up with Dr. Pérez in 4 weeks for  catheter removal.   Call or return sooner than your regularly scheduled visit if you develop any of the following:  Fever (greater than 101.3F) or flu-like symptoms, uncontrolled pain, uncontrolled nausea or vomiting, as well as increased redness, swelling, or drainage from your wound.  Call or return sooner if you notice bladder or kidney pain, difficulty with your catheter drainage or problem with your drains (redness, pain or increasing drain output (rather than decreasing output)).       Questions:  If you have any questions here are some phone numbers and emails you can use to reach us:  - Dr. Pérez's nurse-Brittany [phone]  - Nursing phone helpline at the Urology Clinic (8A-5P M-F): [phone] and choose option 3  - Nights or weekends, call [phone] and ask the  to page the urology resident on call.   - For emergencies, always call 911                  Your next 10 appointments already scheduled     Jan 08, 2018  2:00 PM CST   (Arrive by 1:45 PM)   Post-Op with Blane Pérez MD   [facility] ([facility])    [address] [place] [address]  [address] [facility] [address] [phone]              Further instructions from your care team         Discharge Instructions: Caring for Your Indwelling Urinary Catheter  You have been discharged with an indwelling urinary catheter (also called a Quintanilla catheter). A catheter is a thin, flexible tube. An indwelling urinary catheter has two parts. The first part is a tube that drains urine from your bladder. The second part is a bag or other device that collects the urine.  The most important thing to remember is that you want to prevent infection. Always wash your hands before handling your catheter bag or tubing.  Draining the bedside bag    Wash your hands with soap and clean, running water or use an alcohol-based hand  that contains at least 60% alcohol.    Hold  the drainage tube over a toilet or measuring container.    Unclamp the tube and let the bag drain.    Don t touch the tip of the drainage tube or let it touch the toilet or container.  Cleaning the drainage tube    When the bag is empty, clean the tip of the drainage tube with an alcohol wipe.    Clamp the tube.    Reinsert the tube into the pocket on the drainage bag.  Cleaning your skin and tubing    Clean the skin near the catheter with soap and water.    Wash your genital area from front to back.    Wash the catheter tubing. Always wash the catheter in the direction away from your body.    You will be told when and how to change your bag and tubing.    Don t try to remove the catheter by yourself.    You may shower with the catheter in place.    Do not try to pull or remove your catheter. This will injure your urethra. It must be removed by your healthcare provider or nurse.        Pending Results     Date and Time Order Name Status Description    12/22/2017 0621 EKG 12-lead, tracing only Preliminary     12/21/2017 1522 Surgical pathology exam In process     12/21/2017 0951 EKG 12-lead, tracing only Preliminary             Statement of Approval     Ordered          12/22/17 1149  I have reviewed and agree with all the recommendations and orders detailed in this document.  EFFECTIVE NOW     Approved and electronically signed by:  Tess Sanchez MD             Admission Information     Date & Time Provider Department Dept. Phone    12/21/2017 Blane Pérez MD Unit 6D Observation [facility] [phone]      Your Vitals Were     Blood Pressure Pulse Temperature Respirations Height Weight    146/72 59 97.5  F (36.4  C) (Oral) 16 1.829 m (6') 99.9 kg (220 lb 3.8 oz)    Pulse Oximetry BMI (Body Mass Index)                96% 29.87 kg/m2          [employer] Information     [employer] gives you secure access to your electronic health record. If you see a primary care provider, you can also send messages to your  care team and make appointments. If you have questions, please call your primary care clinic.  If you do not have a primary care provider, please call [phone] and they will assist you.        Care EveryWhere ID     This is your Care EveryWhere ID. This could be used by other organizations to access your [facility] medical records  [id number]        Equal Access to Services     GUS SANDY : Hadradha grossman Sobonnie, waaxda luqadaha, qaybta kaalyenni avila, elaine ibrahim . So [facility] [phone].    ATENCIÓN: Si habla español, tiene a alvarez disposición servicios gratuitos de asistencia lingüística. Llame al [phone].    We comply with applicable federal civil rights laws and [place] laws. We do not discriminate on the basis of race, color, national origin, age, disability, sex, sexual orientation, or gender identity.               Review of your medicines      START taking        Dose / Directions    bacitracin 500 UNIT/GM Oint   Used for:  Male urinary stress incontinence        Apply topically 2 times daily   Quantity:  30 g   Refills:  1       ciprofloxacin 500 MG tablet   Commonly known as:  CIPRO   Indication:  Preventative Medication Therapy Used Around Surgery   Used for:  Male urinary stress incontinence        Dose:  500 mg   Take 1 tablet (500 mg) by mouth every 24 hours   Quantity:  30 tablet   Refills:  0       oxyCODONE IR 5 MG tablet   Commonly known as:  ROXICODONE   Used for:  Male urinary stress incontinence        Dose:  5 mg   Take 1 tablet (5 mg) by mouth every 4 hours as needed for pain   Quantity:  30 tablet   Refills:  0       senna-docusate 8.6-50 MG per tablet   Commonly known as:  SENOKOT-S;PERICOLACE   Used for:  Male urinary stress incontinence        Dose:  1 tablet   Take 1 tablet by mouth 2 times daily as needed for constipation   Quantity:  100 tablet   Refills:  0       tolterodine 4 MG 24 hr capsule   Commonly known as:  DETROL LA   Used for:   Male urinary stress incontinence        Dose:  4 mg   Take 1 capsule (4 mg) by mouth daily   Quantity:  30 capsule   Refills:  0         CONTINUE these medicines which have NOT CHANGED        Dose / Directions    amLODIPine 5 MG tablet   Commonly known as:  NORVASC        Dose:  5 mg   Take 5 mg by mouth every morning   Refills:  0       bisacodyl 5 MG EC tablet        Dose:  15 mg   Take 15 mg by mouth every 3 days   Refills:  0       clonazePAM 1 MG tablet   Commonly known as:  klonoPIN        Dose:  0.5-1 mg   Take 0.5-1 mg by mouth   Refills:  0       Doxycycline Hyclate 100 MG Tbec        Dose:  100 mg   Take 100 mg by mouth 2 times daily   Refills:  0       levothyroxine 100 MCG tablet   Commonly known as:  SYNTHROID/LEVOTHROID        Dose:  100 mcg   Take 100 mcg by mouth every morning   Refills:  0       metFORMIN 500 MG tablet   Commonly known as:  GLUCOPHAGE        Dose:  500 mg   Take 500 mg by mouth 2 times daily (with meals)   Refills:  0       mupirocin 2 % ointment   Commonly known as:  BACTROBAN        Apply topically as needed   Refills:  0       potassium citrate 15 MEQ (1620 MG) Tbcr        Dose:  1 tablet   Take 1 tablet by mouth 2 times daily   Refills:  0       rosuvastatin 10 MG tablet   Commonly known as:  CRESTOR        Dose:  10 mg   Take 10 mg by mouth every morning   Refills:  0         STOP taking     clopidogrel 75 MG tablet   Commonly known as:  PLAVIX                Where to get your medicines      These medications were sent to [facility] [place], [place] - [address] [address]     Phone:  [phone]     bacitracin 500 UNIT/GM Oint    ciprofloxacin 500 MG tablet    senna-docusate 8.6-50 MG per tablet    tolterodine 4 MG 24 hr capsule         Some of these will need a paper prescription and others can be bought over the counter. Ask your nurse if you have questions.     Bring a paper prescription for each of these  medications     oxyCODONE IR 5 MG tablet               ANTIBIOTIC INSTRUCTION     You've Been Prescribed an Antibiotic - Now What?  Your healthcare team thinks that you or your loved one might have an infection. Some infections can be treated with antibiotics, which are powerful, life-saving drugs. Like all medications, antibiotics have side effects and should only be used when necessary. There are some important things you should know about your antibiotic treatment.      Your healthcare team may run tests before you start taking an antibiotic.    Your team may take samples (e.g., from your blood, urine or other areas) to run tests to look for bacteria. These test can be important to determine if you need an antibiotic at all and, if you do, which antibiotic will work best.      Within a few days, your healthcare team might change or even stop your antibiotic.    Your team may start you on an antibiotic while they are working to find out what is making you sick.    Your team might change your antibiotic because test results show that a different antibiotic would be better to treat your infection.    In some cases, once your team has more information, they learn that you do not need an antibiotic at all. They may find out that you don't have an infection, or that the antibiotic you're taking won't work against your infection. For example, an infection caused by a virus can't be treated with antibiotics. Staying on an antibiotic when you don't need it is more likely to be harmful than helpful.      You may experience side effects from your antibiotic.    Like all medications, antibiotics have side effects. Some of these can be serious.    Let you healthcare team know if you have any known allergies when you are admitted to the hospital.    One significant side effect of nearly all antibiotics is the risk of severe and sometimes deadly diarrhea caused by Clostridium difficile (C. Difficile). This occurs when a person  takes antibiotics because some good germs are destroyed. Antibiotic use allows C. diificile to take over, putting patients at high risk for this serious infection.    As a patient or caregiver, it is important to understand your or your loved one's antibiotic treatment. It is especially important for caregivers to speak up when patients can't speak for themselves. Here are some important questions to ask your healthcare team.    What infection is this antibiotic treating and how do you know I have that infection?    What side effects might occur from this antibiotic?    How long will I need to take this antibiotic?    Is it safe to take this antibiotic with other medications or supplements (e.g., vitamins) that I am taking?     Are there any special directions I need to know about taking this antibiotic? For example, should I take it with food?    How will I be monitored to know whether my infection is responding to the antibiotic?    What tests may help to make sure the right antibiotic is prescribed for me?      Information provided by:  www.cdc.gov/getsmart  U.S. Department of Health and Human Services  Centers for disease Control and Prevention  National Center for Emerging and Zoonotic Infectious Diseases  Division of Healthcare Quality Promotion         Protect others around you: Learn how to safely use, store and throw away your medicines at www.disposemymeds.org.             Medication List: This is a list of all your medications and when to take them. Check marks below indicate your daily home schedule. Keep this list as a reference.      Medications           Morning Afternoon Evening Bedtime As Needed    amLODIPine 5 MG tablet   Commonly known as:  NORVASC   Take 5 mg by mouth every morning                                bacitracin 500 UNIT/GM Oint   Apply topically 2 times daily   Last time this was given:  12/21/2017  8:38 PM                                bisacodyl 5 MG EC tablet   Take 15 mg by mouth  every 3 days                                ciprofloxacin 500 MG tablet   Commonly known as:  CIPRO   Take 1 tablet (500 mg) by mouth every 24 hours   Last time this was given:  500 mg on 12/22/2017  8:50 AM                                clonazePAM 1 MG tablet   Commonly known as:  klonoPIN   Take 0.5-1 mg by mouth                                Doxycycline Hyclate 100 MG Tbec   Take 100 mg by mouth 2 times daily                                levothyroxine 100 MCG tablet   Commonly known as:  SYNTHROID/LEVOTHROID   Take 100 mcg by mouth every morning                                metFORMIN 500 MG tablet   Commonly known as:  GLUCOPHAGE   Take 500 mg by mouth 2 times daily (with meals)                                mupirocin 2 % ointment   Commonly known as:  BACTROBAN   Apply topically as needed                                oxyCODONE IR 5 MG tablet   Commonly known as:  ROXICODONE   Take 1 tablet (5 mg) by mouth every 4 hours as needed for pain   Last time this was given:  5 mg on 12/22/2017  8:50 AM                                potassium citrate 15 MEQ (1620 MG) Tbcr   Take 1 tablet by mouth 2 times daily                                rosuvastatin 10 MG tablet   Commonly known as:  CRESTOR   Take 10 mg by mouth every morning                                senna-docusate 8.6-50 MG per tablet   Commonly known as:  SENOKOT-S;PERICOLACE   Take 1 tablet by mouth 2 times daily as needed for constipation                                tolterodine 4 MG 24 hr capsule   Commonly known as:  DETROL LA   Take 1 capsule (4 mg) by mouth daily   Last time this was given:  4 mg on 12/22/2017  8:50 AM

## 2017-12-21 NOTE — ANESTHESIA POSTPROCEDURE EVALUATION
Patient: Uday Montilla    Procedure(s):  Removal of control pump, pressure regulating balloon and cuff.  Replacement Of Artificial Urinary Sphincter control pump and pressure regulating balloon, Cystoscopy  - Wound Class: II-Clean Contaminated   - Wound Class: II-Clean Contaminated    Diagnosis:Male Urinary Stress Incontinence   Diagnosis Additional Information: No value filed.    Anesthesia Type:  General, ETT, LMA    Note:  Anesthesia Post Evaluation    Patient location during evaluation: PACU  Patient participation: Able to fully participate in evaluation  Level of consciousness: awake and alert  Pain management: adequate  Airway patency: patent  Cardiovascular status: acceptable  Respiratory status: acceptable  Hydration status: acceptable  PONV: none             Last vitals:  Vitals:    12/21/17 0936   BP: (!) 178/91   Resp: 15   Temp: 36.7  C (98.1  F)   SpO2: 100%         Electronically Signed By: Min Garcia MD  December 21, 2017  3:10 PM

## 2017-12-21 NOTE — IP AVS SNAPSHOT
Unit [address] Observation [address] [address]    Phone:  [phone]    Fax:  [phone]                                       After Visit Summary   12/21/2017    Uday Montilla    MRN: [id number]           After Visit Summary Signature Page     I have received my discharge instructions, and my questions have been answered. I have discussed any challenges I see with this plan with the nurse or doctor.    ..........................................................................................................................................  Patient/Patient Representative Signature      ..........................................................................................................................................  Patient Representative Print Name and Relationship to Patient    ..................................................               ................................................  Date                                            Time    ..........................................................................................................................................  Reviewed by Signature/Title    ...................................................              ..............................................  Date                                                            Time

## 2017-12-22 VITALS
SYSTOLIC BLOOD PRESSURE: 146 MMHG | HEIGHT: 72 IN | OXYGEN SATURATION: 96 % | DIASTOLIC BLOOD PRESSURE: 72 MMHG | RESPIRATION RATE: 16 BRPM | TEMPERATURE: 97.5 F | HEART RATE: 59 BPM | BODY MASS INDEX: 29.83 KG/M2 | WEIGHT: 220.24 LBS

## 2017-12-22 DIAGNOSIS — Z98.890 HISTORY OF URETHROTOMY: Primary | ICD-10-CM

## 2017-12-22 PROBLEM — R32 INCONTINENCE: Status: ACTIVE | Noted: 2017-12-22

## 2017-12-22 LAB
INTERPRETATION ECG - MUSE: NORMAL
INTERPRETATION ECG - MUSE: NORMAL

## 2017-12-22 PROCEDURE — 25000132 ZZH RX MED GY IP 250 OP 250 PS 637: Mod: GY | Performed by: UROLOGY

## 2017-12-22 PROCEDURE — 93010 ELECTROCARDIOGRAM REPORT: CPT | Performed by: INTERNAL MEDICINE

## 2017-12-22 PROCEDURE — 25000128 H RX IP 250 OP 636: Performed by: UROLOGY

## 2017-12-22 PROCEDURE — A9270 NON-COVERED ITEM OR SERVICE: HCPCS | Mod: GY | Performed by: UROLOGY

## 2017-12-22 PROCEDURE — 40000275 ZZH STATISTIC RCP TIME EA 10 MIN

## 2017-12-22 PROCEDURE — 93005 ELECTROCARDIOGRAM TRACING: CPT

## 2017-12-22 RX ORDER — OXYCODONE HYDROCHLORIDE 5 MG/1
5 TABLET ORAL EVERY 4 HOURS PRN
Qty: 30 TABLET | Refills: 0 | Status: SHIPPED | OUTPATIENT
Start: 2017-12-22 | End: 2018-03-06

## 2017-12-22 RX ORDER — TOLTERODINE 4 MG/1
4 CAPSULE, EXTENDED RELEASE ORAL DAILY
Qty: 30 CAPSULE | Refills: 0 | Status: SHIPPED | OUTPATIENT
Start: 2017-12-22 | End: 2018-01-22

## 2017-12-22 RX ORDER — CIPROFLOXACIN 500 MG/1
500 TABLET, FILM COATED ORAL EVERY 24 HOURS
Qty: 30 TABLET | Refills: 0 | Status: SHIPPED | OUTPATIENT
Start: 2017-12-22 | End: 2018-01-22

## 2017-12-22 RX ORDER — GINSENG 100 MG
CAPSULE ORAL 2 TIMES DAILY
Qty: 30 G | Refills: 1 | Status: SHIPPED | OUTPATIENT
Start: 2017-12-22

## 2017-12-22 RX ORDER — OXYCODONE HYDROCHLORIDE 5 MG/1
5-10 TABLET ORAL EVERY 6 HOURS PRN
Qty: 30 TABLET | Refills: 0 | Status: SHIPPED | OUTPATIENT
Start: 2017-12-22 | End: 2017-12-22

## 2017-12-22 RX ORDER — AMOXICILLIN 250 MG
1 CAPSULE ORAL 2 TIMES DAILY PRN
Qty: 100 TABLET | Refills: 0 | Status: ON HOLD | OUTPATIENT
Start: 2017-12-22 | End: 2018-03-08

## 2017-12-22 RX ADMIN — OXYCODONE HYDROCHLORIDE 5 MG: 5 TABLET ORAL at 08:50

## 2017-12-22 RX ADMIN — SODIUM CHLORIDE: 9 INJECTION, SOLUTION INTRAVENOUS at 02:38

## 2017-12-22 RX ADMIN — TOLTERODINE 4 MG: 4 CAPSULE, EXTENDED RELEASE ORAL at 08:50

## 2017-12-22 RX ADMIN — CIPROFLOXACIN HYDROCHLORIDE 500 MG: 500 TABLET, FILM COATED ORAL at 08:50

## 2017-12-22 NOTE — PROGRESS NOTES
IV's removed.  Discharge instructions given and questions answered.  Pt. Able to verbalize understanding.  Pt. Has all belongings.  Daughter is driving pt. Home.

## 2017-12-22 NOTE — PLAN OF CARE
Problem: Patient Care Overview  Goal: Plan of Care/Patient Progress Review  Outpatient/Observation goals to be met before discharge home:    1) Pain well controlled - YES  2) Ambulating independently - YES  3) Voiding adequately - YES, Quintanilla in place and and draining adequate amounts of clear yellow urine; Quintanilla will remain in place for 4 weeks post-proceedure  4) Stable vital signs - YES

## 2017-12-22 NOTE — PROGRESS NOTES
Urology Progress Note  12/22/2017  Admit Date: 12/21/2017    Interval History:  DARELL overnight   No complaints  Pain controlled    Physical Exam:  Temp:  [95.4  F (35.2  C)-98.2  F (36.8  C)] 97.5  F (36.4  C)  Pulse:  [59] 59  Heart Rate:  [45-82] 82  Resp:  [10-16] 16  BP: ()/(48-91) 146/72  SpO2:  [92 %-100 %] 96 %    I/O last 3 completed shifts:  In: 1730 [P.O.:1030; I.V.:700]  Out: 350 [Urine:325; Blood:25]    /350    Gen: NAD, lying comfortably in bed  Pulm: NC  Abdomen: soft, ATTP, nondistended , wounds c/d/i  : Stanley in place with clear urine, exam deferred  Extremities: without cyanosis or edema  Neuro: no focal deficits     Labs:    CMP  Recent Labs  Lab 12/21/17  1031   POTASSIUM 4.1   CR 1.14   GFRESTIMATED 61     CBCNo lab results found in last 7 days.    Assessment:  Uday Montilla is a 82 year old male with history of urinary incontinence, POD#1  s/p removal of AUS with re-implantation of pressure regulating balloon and pump; no cuff replaced due to urethrotomy.      Today:    Pain: continue PRN pain meds     Diet: ADAT to regular     : continue stanley catheter on discharge x4 weeks     PPX: ambulation/IS    Dispo: 6D, DC home today     Patient seen on rounds with resident team, plan to be discussed with Dr. Mike.     --    Harpal Grullon MD  Urology Resident    6:42 AM, 12/22/2017     Contacting the Urology Team     Please use the following job codes to reach the Urology Team. Note that you must use an in house phone and that job codes cannot receive text pages.     On weekdays, dial [phone] then [phone] to reach the Adult Urology resident or PA on call    On weekdays, dial [phone] then [phone] to reach the Pediatric Urology resident    On weeknights and weekends, dial [phone] then [phone] to reach the Urology resident on call (for both Adult and Pediatrics)

## 2017-12-22 NOTE — DISCHARGE INSTRUCTIONS
Discharge Instructions: Caring for Your Indwelling Urinary Catheter  You have been discharged with an indwelling urinary catheter (also called a Quintanilla catheter). A catheter is a thin, flexible tube. An indwelling urinary catheter has two parts. The first part is a tube that drains urine from your bladder. The second part is a bag or other device that collects the urine.  The most important thing to remember is that you want to prevent infection. Always wash your hands before handling your catheter bag or tubing.  Draining the bedside bag    Wash your hands with soap and clean, running water or use an alcohol-based hand  that contains at least 60% alcohol.    Hold the drainage tube over a toilet or measuring container.    Unclamp the tube and let the bag drain.    Don t touch the tip of the drainage tube or let it touch the toilet or container.  Cleaning the drainage tube    When the bag is empty, clean the tip of the drainage tube with an alcohol wipe.    Clamp the tube.    Reinsert the tube into the pocket on the drainage bag.  Cleaning your skin and tubing    Clean the skin near the catheter with soap and water.    Wash your genital area from front to back.    Wash the catheter tubing. Always wash the catheter in the direction away from your body.    You will be told when and how to change your bag and tubing.    Don t try to remove the catheter by yourself.    You may shower with the catheter in place.    Do not try to pull or remove your catheter. This will injure your urethra. It must be removed by your healthcare provider or nurse.

## 2017-12-22 NOTE — PLAN OF CARE
Problem: Patient Care Overview  Goal: Plan of Care/Patient Progress Review  Outpatient/Observation goals to be met before discharge home:     1) Pain well controlled - NO, patient has increased pain with movement, Tylenol given with no relief, Oxycodone 5 mg given, pt states that worked much better.   2) Ambulating independently - NO, patient ambulated hallways with SBA, appears fairly steady  3) Voiding adequately - YES, Quintanilla in place and and draining adequate amounts of clear yellow urine; Quintanilla will remain in place for 4 weeks post-proceedure  4) Stable vital signs - YES

## 2017-12-22 NOTE — OR NURSING
Patient remains stable with little complaints of pain other than lower left pelvic region incision.  No bleeding noted to fluffs under penis region.  Dr. Pérze and Dr PADGETT to bedside to discuss procedure with patient and need for over night stay for pain management, Antibiotics and urine output monitoring.  Patient verbalizes understanding.  Plan is to have stanley catheter in place until end of January and urethral cuff exchange later on when things have healed.  Refer to Dr Pérez's post op note.  Report called and family updated.  Belongings sent with patient.

## 2017-12-22 NOTE — PLAN OF CARE
Problem: Patient Care Overview  Goal: Plan of Care/Patient Progress Review  Outpatient/Observation goals to be met before discharge home:    1) Pain well controlled - YES, patient has minimal pain, declines pain medication  2) Ambulating independently - NO  3) Voiding adequately - YES, Quintanilla in place and will remain for 4 weeks post-proceedure  4) Stable vital signs - YES

## 2017-12-22 NOTE — OR NURSING
Dr Hillou to bedside and updated on patient's status.  Heart rate continues to be 40's to 50's.  Noted did take Norvasc this morning and Dr made aware.  0.2 mg of robinol given as ordered.  Heart increased into the 70's with rare to occasional PVC's.  Patient remains asymptomatic with heart rates.  Patient reports discomfort of lower left pelvic incision but declines need for pain medication.

## 2017-12-22 NOTE — DISCHARGE SUMMARY
[facility] Discharge Summary    Patient: Uday Montilla    MRN: [id number]   : 1935         Date of Admission:  2017   Date of Discharge::  2017  1:18 PM  Admitting Physician:  Blane Mike MD  Discharge Physician:  Kait Lacy MD           Admission Diagnoses:   Male Urinary Stress Incontinence   KRISTIAN (stress urinary incontinence), male  KRISTIAN (stress urinary incontinence), male  Incontinence  Past Medical History:   Diagnosis Date     Coronary artery disease      Diabetes (H)     Type II     Hyperlipidemia      Hypertension      Hypothyroid      Incontinence of urine      Kidney stones      Prostate cancer (H)              Discharge Diagnosis:   Male Urinary Stress Incontinence   Past Medical History:   Diagnosis Date     Coronary artery disease      Diabetes (H)     Type II     Hyperlipidemia      Hypertension      Hypothyroid      Incontinence of urine      Kidney stones      Prostate cancer (H)             Procedures:   Procedure(s): REPLACE PROSTHESIS SPHINCTER URINARY  CYSTOSCOPY             Medications Prior to Admission:     No prescriptions prior to admission.             Discharge Medications:     Discharge Medication List as of 2017 12:07 PM      START taking these medications    Details   bacitracin 500 UNIT/GM OINT Apply topically 2 times dailyDisp-30 g, [id number]      ciprofloxacin (CIPRO) 500 MG tablet Take 1 tablet (500 mg) by mouth every 24 hours, Disp-30 tablet, R-0, E-Prescribe      senna-docusate (SENOKOT-S;PERICOLACE) 8.6-50 MG per tablet Take 1 tablet by mouth 2 times daily as needed for constipation, Disp-100 tablet, R-0, E-Prescribe      tolterodine (DETROL LA) 4 MG 24 hr capsule Take 1 capsule (4 mg) by mouth daily, Disp-30 capsule, R-0, E-Prescribe         CONTINUE these medications which have CHANGED    Details   oxyCODONE IR (ROXICODONE) 5 MG tablet Take 1 tablet (5 mg) by mouth every 4 hours as needed for pain,  Disp-30 tablet, R-0, Local Print         CONTINUE these medications which have NOT CHANGED    Details   amLODIPine (NORVASC) 5 MG tablet Take 5 mg by mouth every morning , Historical      bisacodyl (DULCOLAX) 5 MG EC tablet Take 15 mg by mouth every 3 days , Historical      clonazePAM (KLONOPIN) 1 MG tablet Take 0.5-1 mg by mouth, Historical      rosuvastatin (CRESTOR) 10 MG tablet Take 10 mg by mouth every morning , Historical      potassium citrate 15 MEQ (1620 MG) TBCR Take 1 tablet by mouth 2 times daily , Historical      mupirocin (BACTROBAN) 2 % ointment Apply topically as needed Historical      metFORMIN (GLUCOPHAGE) 500 MG tablet Take 500 mg by mouth 2 times daily (with meals) , Historical      levothyroxine (SYNTHROID/LEVOTHROID) 100 MCG tablet Take 100 mcg by mouth every morning , Historical      Doxycycline Hyclate 100 MG TBEC Take 100 mg by mouth 2 times daily , Historical         STOP taking these medications       clopidogrel (PLAVIX) 75 MG tablet Comments:   Reason for Stopping:                     Consultations:   Consultation during this admission received: none          Brief History of Illness:   Reason for admission requiring a surgical or invasive procedure:   Male Urinary Stress Incontinence    The patient underwent the following procedure(s):   See above   There were no immediate complications during this procedure.    Please refer to the full operative summary for details.           Hospital Course:   The patient is s/p removal of AUS with re-implantation of pressure regulating balloon and pump. Procedure noted for urethrotomy, which was closed. Decision was thus made to NOT place a cuff.  Patient was admitted to observation for post op pain control and was discharged on POD#1.     Immediately post op, he was noted to have bradycardia with HR in 40s-50s but was asymptomatic and reported taking his noravasc this am. Heart rate responded appropriately to a one-time dose of robinol (0.2 mg) and  no issues with HR were reported afterwards.  Of note, he was discharged home with a stanley catheter, to stay for 4 weeks from d/c.     The patient's hospital course was otherwise unremarkable.  Uday Montilla recovered as anticipated and experienced no post-operative complications.  On POD#1 he was ambulating without assitance, tolerating the discharge diet, had pain controlled with PO medications to go home with, and requiring no IV medications or fluids. He was given appropriate contact information, follow-up and instructions as seen below in the discharge paperwork.         Discharge Instructions and Follow-Up:    Diet:  -  Regular    Activity:   - No strenuous exercise for 6 weeks.   - No lifting, pushing, pulling more than 15 pounds for 6 weeks.   - Do not strain with bowel movements.   - Do not drive until you can press the brake pedal quickly and fully without pain.   - Do not operate a motor vehicle while taking narcotic pain medications.     Medications:   - Transition from narcotic pain medications to tylenol (acetaminophen) as you are able.   - Wean yourself off all pain medications as you are able.   - Some pain medications contain both tylenol (acetaminophen) and a narcotic (Norco, vicodin, percocet).  Do not take more than 4,000mg of Tylenol (acetaminophen) from all sources in any 24 hour period as this can cause liver damage.   - Narcotics can make you constipated. Take over the counter fiber (metamucil or benefiber) and stool softeners (miralax, docusate or senna) while using narcotic pain medications, but stop if you develop diarrhea.   - No driving or operating machinery while taking narcotic pain medications    Incisions:   - You may shower and get incisions wet starting 48 hrs after surgery.  - Do not scrub incisions or submerge wounds in a bath, pool, hot tub, etc. for 2 weeks.   - Remove wound dressing 48 hours after surgery.   - If purple dermabond glue was used, avoid applying any  lotions or ointments.   - If steri-strips were used, they will fall off on their own.   - Leave incision open to air.  Cover with gauze only if needed for comfort or to protect clothing from drainage.     Tubes / drains:  Quintanilla on d/c, to remain for 4 weeks from surgery    Follow-Up:   - Call your primary care provider to touch base regarding your recent admission.   - Follow up with Dr. Mike as scheduled on 1/22/2018  - Call or return sooner than your regularly scheduled visit if you develop any of the following:  Fever (greater than 101.3F), uncontrolled pain, uncontrolled nausea or vomiting, as well as increased redness, swelling, or drainage from your wound.    Phone numbers:  - Monday through Friday 8am to 4:30pm: call [phone].    - Nights or weekends, call [phone] and ask the  to page the urology resident on call.   - For emergencies, always call [phone]             Discharge Disposition:   Discharged to home      Attestation: I have reviewed today's vital signs, notes, medications, labs and imaging.    Kait Lacy MD MS  Urology Resident  December 22, 2017

## 2017-12-22 NOTE — PROVIDER NOTIFICATION
Paged urology team RE: patient's HR intermittently dropping to mid 40's while asleep. Asymptomatic.   BP Readings from Last 3 Encounters:   12/21/17 138/76   12/14/17 165/75   11/20/17 146/79

## 2017-12-22 NOTE — PROGRESS NOTES
/72  Pulse 59  Temp 97.5  F (36.4  C) (Oral)  Resp 16  Ht 1.829 m (6')  Wt 99.9 kg (220 lb 3.8 oz)  SpO2 96%  BMI 29.87 kg/m2    Reason for admission: s/p artificial sphincter removal  Vitals: within normal limits, except HR is low  Activity: SBA  Pain: minimal, one time dose of as needed given at start of shift  Neuro: wnl  Cardiac: bradycardic-paged team-EKG ordered  Respiratory: wnl  GI/: stanley in place-no hematuria  Diet: regular  Lines: PIV-/hr  Wounds/Incisions: 3 surgical incisions, CDI  Labs/imaging/Consults: EKG in process  Discharge Plan: possible d/c today

## 2017-12-25 ENCOUNTER — HOSPITAL ENCOUNTER (EMERGENCY)
Facility: CLINIC | Age: 82
Discharge: HOME | End: 2017-12-25
Attending: EMERGENCY MEDICINE | Admitting: EMERGENCY MEDICINE
Payer: MEDICARE

## 2017-12-25 VITALS
SYSTOLIC BLOOD PRESSURE: 177 MMHG | OXYGEN SATURATION: 97 % | DIASTOLIC BLOOD PRESSURE: 73 MMHG | HEART RATE: 56 BPM | TEMPERATURE: 97.9 F

## 2017-12-25 DIAGNOSIS — T83.9XXA PROBLEM WITH FOLEY CATHETER, INITIAL ENCOUNTER (H): ICD-10-CM

## 2017-12-25 PROCEDURE — 51798 US URINE CAPACITY MEASURE: CPT | Performed by: EMERGENCY MEDICINE

## 2017-12-25 PROCEDURE — 99284 EMERGENCY DEPT VISIT MOD MDM: CPT | Mod: 25 | Performed by: EMERGENCY MEDICINE

## 2017-12-25 PROCEDURE — 51798 US URINE CAPACITY MEASURE: CPT | Mod: 26 | Performed by: EMERGENCY MEDICINE

## 2017-12-25 PROCEDURE — 99284 EMERGENCY DEPT VISIT MOD MDM: CPT | Performed by: EMERGENCY MEDICINE

## 2017-12-25 RX ORDER — MAGNESIUM HYDROXIDE 1200 MG/15ML
LIQUID ORAL
Status: DISCONTINUED
Start: 2017-12-25 | End: 2017-12-25 | Stop reason: HOSPADM

## 2017-12-25 RX ORDER — CIPROFLOXACIN 500 MG/1
500 TABLET, FILM COATED ORAL ONCE
Status: DISCONTINUED | OUTPATIENT
Start: 2017-12-25 | End: 2017-12-26 | Stop reason: HOSPADM

## 2017-12-25 NOTE — ED AVS SNAPSHOT
[facility], [place], Emergency Department    [address] [place] [address]    Phone:  [phone]                                       Uday Montilla   MRN: [id number]    Department:  [facility], Emergency Department   Date of Visit:  12/25/2017           After Visit Summary Signature Page     I have received my discharge instructions, and my questions have been answered. I have discussed any challenges I see with this plan with the nurse or doctor.    ..........................................................................................................................................  Patient/Patient Representative Signature      ..........................................................................................................................................  Patient Representative Print Name and Relationship to Patient    ..................................................               ................................................  Date                                            Time    ..........................................................................................................................................  Reviewed by Signature/Title    ...................................................              ..............................................  Date                                                            Time

## 2017-12-25 NOTE — ED AVS SNAPSHOT
[facility], Emergency Department    [address] [facility] [address]    Phone:  [phone]                                       Uday Montilla   MRN: [id number]    Department:  [facility], Emergency Department   Date of Visit:  12/25/2017           Patient Information     Date Of Birth          1935        Your diagnoses for this visit were:     None       You were seen by Damián Wu MD.        Discharge Instructions       Please make an appointment to follow up with Urology Clinic (phone: [phone]) as soon as possible if you have any concerns.      Future Appointments        Provider Department Dept Phone Center    1/22/2018 1:00 PM  [facility]; [facility] XRAY ROOM 2 [facility] Xray [phone] [facility]    1/22/2018 2:00 PM Blane Pérez MD [facility] Urology and [facility] [phone] [facility]      24 Hour Appointment Hotline       To make an appointment at any [facility] clinic, call [phone] ([phone]). If you don't have a family doctor or clinic, we will help you find one. [facility] clinics are conveniently located to serve the needs of you and your family.             Review of your medicines      Our records show that you are taking the medicines listed below. If these are incorrect, please call your family doctor or clinic.        Dose / Directions Last dose taken    amLODIPine 5 MG tablet   Commonly known as:  NORVASC   Dose:  5 mg        Take 5 mg by mouth every morning   Refills:  0        bacitracin 500 UNIT/GM Oint   Quantity:  30 g        Apply topically 2 times daily   Refills:  1        bisacodyl 5 MG EC tablet   Dose:  15 mg        Take 15 mg by mouth every 3 days   Refills:  0        ciprofloxacin 500 MG tablet   Commonly known as:  CIPRO   Dose:  500 mg   Indication:  Preventative Medication Therapy Used Around Surgery   Quantity:  30 tablet        Take 1 tablet (500 mg) by mouth every  24 hours   Refills:  0        clonazePAM 1 MG tablet   Commonly known as:  klonoPIN   Dose:  0.5-1 mg        Take 0.5-1 mg by mouth   Refills:  0        Doxycycline Hyclate 100 MG Tbec   Dose:  100 mg        Take 100 mg by mouth 2 times daily   Refills:  0        levothyroxine 100 MCG tablet   Commonly known as:  SYNTHROID/LEVOTHROID   Dose:  100 mcg        Take 100 mcg by mouth every morning   Refills:  0        metFORMIN 500 MG tablet   Commonly known as:  GLUCOPHAGE   Dose:  500 mg        Take 500 mg by mouth 2 times daily (with meals)   Refills:  0        mupirocin 2 % ointment   Commonly known as:  BACTROBAN        Apply topically as needed   Refills:  0        oxyCODONE IR 5 MG tablet   Commonly known as:  ROXICODONE   Dose:  5 mg   Quantity:  30 tablet        Take 1 tablet (5 mg) by mouth every 4 hours as needed for pain   Refills:  0        potassium citrate 15 MEQ (1620 MG) Tbcr   Dose:  1 tablet        Take 1 tablet by mouth 2 times daily   Refills:  0        rosuvastatin 10 MG tablet   Commonly known as:  CRESTOR   Dose:  10 mg        Take 10 mg by mouth every morning   Refills:  0        senna-docusate 8.6-50 MG per tablet   Commonly known as:  SENOKOT-S;PERICOLACE   Dose:  1 tablet   Quantity:  100 tablet        Take 1 tablet by mouth 2 times daily as needed for constipation   Refills:  0        tolterodine 4 MG 24 hr capsule   Commonly known as:  DETROL LA   Dose:  4 mg   Quantity:  30 capsule        Take 1 capsule (4 mg) by mouth daily   Refills:  0                Orders Needing Specimen Collection     None      Pending Results     No orders found from 12/23/2017 to 12/26/2017.            Pending Culture Results     No orders found from 12/23/2017 to 12/26/2017.            Pending Results Instructions     If you had any lab results that were not finalized at the time of your Discharge, you can call the ED Lab Result RN at [phone]. You will be contacted by this team for any positive Lab results or  changes in treatment. The nurses are available 7 days a week from 10A to 6:30P.  You can leave a message 24 hours per day and they will return your call.        Thank you for choosing [facility]       Thank you for choosing [facility] for your care. Our goal is always to provide you with excellent care. Hearing back from our patients is one way we can continue to improve our services. Please take a few minutes to complete the written survey that you may receive in the mail after you visit with us. Thank you!        [employer] Information     [facility] gives you secure access to your electronic health record. If you see a primary care provider, you can also send messages to your care team and make appointments. If you have questions, please call your primary care clinic.  If you do not have a primary care provider, please call [phone] and they will assist you.        Care EveryWhere ID     This is your Care EveryWhere ID. This could be used by other organizations to access your [facility] medical records  [id number]        Equal Access to Services     GUS SANDY AH: Hadii lala Leavitt, cordell best, joanne avila, elaine lima. So [facility] [phone].    ATENCIÓN: Si habla español, tiene a alvarez disposición servicios gratuitos de asistencia lingüística. Llame al [phone].    We comply with applicable federal civil rights laws and [place] laws. We do not discriminate on the basis of race, color, national origin, age, disability, sex, sexual orientation, or gender identity.            After Visit Summary       This is your record. Keep this with you and show to your community pharmacist(s) and doctor(s) at your next visit.

## 2017-12-26 LAB — COPATH REPORT: NORMAL

## 2017-12-26 NOTE — ED PROVIDER NOTES
History     Chief Complaint   Patient presents with     Catheter Problem     urinary catheter     HPI  Uday Montilla is a 82 year old male who presents to emergency department with a clogged catheter.  Patient had a urethral surgery on December 21 and has since then maintained the catheter.  Today stated that his catheters had very little output and came to the emergency department for evaluation.    I have reviewed the Medications, Allergies, Past Medical and Surgical History, and Social History in the Epic system.    Review of Systems   All other systems reviewed and are negative.      Physical Exam   BP: 177/73  Pulse: 56  Temp: 97.9  F (36.6  C)  SpO2: 97 %      Physical Exam   Constitutional: He is oriented to person, place, and time. He appears well-developed and well-nourished. No distress.   HENT:   Head: Normocephalic and atraumatic.   Eyes: No scleral icterus.   Neck: Normal range of motion. Neck supple.   Cardiovascular: Normal rate.    Pulmonary/Chest: Effort normal.   Genitourinary:   Genitourinary Comments: Indwelling Quintanilla catheter in place with no signs of obstruction.  There is good flow on flushing.   Neurological: He is alert and oriented to person, place, and time.   Skin: Skin is warm and dry. No rash noted. He is not diaphoretic. No erythema. No pallor.       ED Course     ED Course     Procedures             Critical Care time:  none             Labs Ordered and Resulted from Time of ED Arrival Up to the Time of Departure from the ED - No data to display         Assessments & Plan (with Medical Decision Making)   This is a 82-year-old male coming into the emergency department thinking that he had a clogged catheter.  Urology was at the bedside upon my initial evaluation and has already flushed the Quintanilla catheter.  It appears that the Quintanilla is intact with no obvious signs of obstruction.  We did a bedside ultrasound which shows that the bladder is decompressed.  At this time surgery  is comfortable discharging the patient and believes that he can safely follow up in the clinic.    I have reviewed the nursing notes.    I have reviewed the findings, diagnosis, plan and need for follow up with the patient.    Discharge Medication List as of 12/25/2017 10:15 PM          Final diagnoses:   Problem with Quintanilla catheter, initial encounter (H)       12/25/2017   [facility], [place], EMERGENCY DEPARTMENT     Damián Wu MD  12/25/17 [id number]       Damián Wu MD  12/25/17 7642

## 2017-12-26 NOTE — DISCHARGE INSTRUCTIONS
Please make an appointment to follow up with Urology Clinic (phone: [phone]) as soon as possible if you have any concerns.

## 2017-12-26 NOTE — CONSULTS
Urology Consult    Name: Uday Montilla    MRN: [id number]   YOB: 1935               Chief Complaint:   Stanley catheter not draining in patient who s/p recent AUS replacement          History of Present Illness:   Uday Montilla is a 82 year old male with PMH of radical prostatectomy and EBRT in 2000 with subsequent KRISTIAN that is s/p AUS placement in 2010, with device malfunction resulting in recurrence of incontinence for past 1.5y. He then saw Dr. Mike for this issue and underwent AUS removal and replacement on 12/21/2017. Procedure noted for urethrotomy, which was closed, and a decision was thus made to not place the device cuff, to cap the ends of the pressure regulating balloon and pump. A 16Fr stanley catheter was placed at the end of the case with plan for patient to be discharged with this catheter to stay for 4 weeks from surgery. Post op course was uncomplicated and he was discharged the next day.     He then called our emergency urology line to report that his Stanley catheter has not been draining for 3-4 hours.  He did not recall any specific incidents or issues that could have caused this.  He states that he had tried to disconnect his Stanley catheter from his leg bag but was unable to do so, and that he felt that he did not receive proper education regarding using it this before discharge. He denied dysuria, hematuria, fevers, chills, or passage of debris/clots from his catheter.  We advised the patient to present to the emergency department for further assessment.  On presentation to the emergency department, he was in no acute distress, and was starting to feel pressure in the bladder and the urge to urinate, with some drainage from his meatus around his Stanley catheter and continued absence of drainage into his leg bag.          Past Medical History:   Noted for above+ CAD, hypothyroid, HTN, urolithiasis; remainder reviewed in EMR         Past Surgical History:   Noted for  aforementioned RRP 2000, AUS 2010, DANTE, lap radha; remainder reviewed in EMR           Social History:     Former smoker, quit > 20y ago, social etoh         Family History:   Not pertinent; remainder reviewed in EMR         Allergies:   No Known Allergies         Medications:   Noted for detrol; remainder reviewed in EMR         Review of Systems:    ROS: 10 point ROS neg other than the symptoms noted above in the HPI           Physical Exam:   VS:  T: 97.9    HR: 56    BP: 177/73    RR: Data Unavailable   GEN:  AOx3.  NAD.    CV:  RRR  LUNGS: Non-labored breathing.   BACK:  No midline or CVA tenderness.  ABD:  Soft.  NT.  ND.  No rebound or guarding.  No masses.  : uncircumcised, normal penile shaft with 16Fr stanley cather in place with clear pink urine in catheter but no drainage into the leg bag.   EXT:  Warm, well perfused. No edema  SKIN:  Warm.  Dry.  No rashes.  NEURO:  CN grossly intact.       Procedure:     Catheter unplugged from leg bag and repositioned to hub into meatus. Flushed with normal saline to some resistance initially but then flushed without issues, then attached to a new large 1L stanley bag.  Bedside bladder u/s performed by the emergency department physician showed that the bladder was empty.         Impression and Plan:     Impression:   Uday Montilla is a 82 year old male with PMH of RRP + EBRT in 2000 with subsequent male KRISTIAN, status post AUS placement in 2010, with device malfunction roughly 1.5 years ago requiring recent AUS removal and replacement with Dr. Pérez on 12/21/2017; procedure was complicated by urethrotomy, which was closed, and decision to not place AUS cuff and to keep a 16 Latvian Stanley catheter in place, to stay for 4 weeks from surgery.  Patient noted that his catheter was not draining for roughly 3 hours, and this presented to the emergency department.  Catheter was flushed with some initial resistance but ultimately without any issues, and bladder  ultrasound confirmed that the bladder was completely drained.  Patient reported inability to unplug his Quintanilla catheter from his leg bag over the past few days, and that he felt that he did not receive proper education regarding using it this before discharge.      Plan:     - Flush catheter without issue; we will replace both the leg bag and the large Quintanilla bag and provide appropriate education before discharge    -Patient to proceed with planned follow-up with Dr. Pérez in 4 weeks from his surgery    -Patient will be given 1 tab of ciprofloxacin 500 mg for infection prophylaxis of recent catheter manipulation           This patient's exam findings, labs, and imaging discussed with urology staff surgeon Dr. Fox, who developed the treatment plan.    Kait Lacy MD MS  Urology Resident          I agree with above.

## 2017-12-26 NOTE — ED NOTES
Patient noticed his urinary catheter was not draining at about 1530 today. Catheter was placed on the 12/21/17 after surgery.

## 2017-12-27 ENCOUNTER — CARE COORDINATION (OUTPATIENT)
Dept: UROLOGY | Facility: CLINIC | Age: 82
End: 2017-12-27

## 2017-12-27 ENCOUNTER — ALLIED HEALTH/NURSE VISIT (OUTPATIENT)
Dept: UROLOGY | Facility: CLINIC | Age: 82
End: 2017-12-27
Payer: COMMERCIAL

## 2017-12-27 DIAGNOSIS — Z98.890 HISTORY OF URETHROTOMY: Primary | ICD-10-CM

## 2017-12-27 DIAGNOSIS — N39.3 MALE URINARY STRESS INCONTINENCE: ICD-10-CM

## 2017-12-27 ASSESSMENT — PAIN SCALES - GENERAL: PAINLEVEL: NO PAIN (0)

## 2017-12-27 NOTE — PROGRESS NOTES
Urology Postop Phone Note:    Mr. Uday Montilla is a 82 year old male who underwent Removal of control pump, pressure regulating balloon and cuff.  Replacement Of Artificial Urinary Sphincter control pump and pressure regulating balloon, urethral repair, cystoscopy on 12.21.17 with Dr. Pérez for a history of male stress urinary incontinence.  His postoperative course was unremarkable and the patient was d/c to home on 12.22.17.    Patient presented to the ER on 12.25.17 with complaints that the stanley catheter was plugged. He is very frustrated that the nurses in the hospital did not give him instructions on how to care for his stanley catheter at home. He got some instructions in the ER but is still having difficulties changing over the night bag and the leg bag. Patient coming to the clinic today for instructions and teaching.      Narcotics: oxycodone  Antibiotics: Yes, cipro daily x 30 days    Follow up appointment scheduled on 1.22.18 at 1400 with Dr. Pérez, [facility] at 1300.    Informed the patient of the clinic triage nursing # ([phone] option 3) and urology resident on call # for after hours concerns.    Brittany Mayer, RN-BC, BSN  Care Coordinator - Reconstructive Urology

## 2017-12-27 NOTE — MR AVS SNAPSHOT
After Visit Summary   12/27/2017    Uday Montilla    MRN: [id number]           Patient Information     Date Of Birth          1935        Visit Information        Provider Department      12/27/2017 9:30 AM Nurse, [facility] for Prostate and Urologic Cancers        Today's Diagnoses     History of urethrotomy    -  1    Male urinary stress incontinence           Follow-ups after your visit        Your next 10 appointments already scheduled     Jan 22, 2018  1:00 PM CST   XR CYSTOGRAM VOIDING with UCXR2, UC GIGU RAD   [facility] X[facility] ([facility])    [address] [place] [address]  [address]st [facility] [id number] [phone]           Please bring a list of your current medicines to your exam. (Include vitamins, minerals and over-thecounter medicines.) Leave your valuables at home.  Tell your doctor if there is a chance you may be pregnant.  You do not need to do anything special for this exam.            Jan 22, 2018  2:00 PM CST   (Arrive by 1:45 PM)   Post-Op with Blane Pérez MD   [facility] and [facility] for Prostate and Urologic Cancers ([facility])    [address] [place] [address]  [address] [facility] [id number] [phone]              Who to contact     Please call your clinic at [phone] to:    Ask questions about your health    Make or cancel appointments    Discuss your medicines    Learn about your test results    Speak to your doctor   If you have compliments or concerns about an experience at your clinic, or if you wish to file a complaint, please contact [facility] Patient Relations at [phone] or email us at [email or web address]         Additional Information About Your Visit        MyChart Information     [employer] gives you secure access to your electronic health record. If you see a primary care provider, you can also send  Patient here for well male visit concerned with toe nail fungus asking for treatment. He is due for a Tdap. 1. Have you been to the ER, urgent care clinic since your last visit? Hospitalized since your last visit? No    2. Have you seen or consulted any other health care providers outside of the [address] [place] [address] since your last visit? Include any pap smears or colon screening.  No messages to your care team and make appointments. If you have questions, please call your primary care clinic.  If you do not have a primary care provider, please call [phone] and they will assist you.      [employer] is an electronic gateway that provides easy, online access to your medical records. With [employer], you can request a clinic appointment, read your test results, renew a prescription or communicate with your care team.     To access your existing account, please contact your [facility] or call [phone] for assistance.        Care EveryWhere ID     This is your Care EveryWhere ID. This could be used by other organizations to access your [place] medical records  [id number]         Blood Pressure from Last 3 Encounters:   12/25/17 177/73   12/22/17 146/72   12/14/17 165/75    Weight from Last 3 Encounters:   12/21/17 99.9 kg (220 lb 3.8 oz)   12/14/17 100.2 kg (220 lb 12.8 oz)   11/20/17 100 kg (220 lb 6.4 oz)              Today, you had the following     No orders found for display       Primary Care Provider Fax #    Provider Not In System [phone]                Equal Access to Services     [facility] Center[facility]HELLE : Hadii aad ku hadasho Sojhonathanali, waaxda luqadaha, qaybta kaalmada adeegyada, elaine ibrahim . So [facility] [phone].    ATENCIÓN: Si habla español, tiene a alvarez disposición servicios gratuitos de asistencia lingüística. Llame al [phone].    We comply with applicable federal civil rights laws and [place] laws. We do not discriminate on the basis of race, color, national origin, age, disability, sex, sexual orientation, or gender identity.            Thank you!     Thank you for choosing [facility] UROLOGY AND [facility] FOR PROSTATE AND UROLOGIC CANCERS  for your care. Our goal is always to provide you with excellent care. Hearing back from our patients is one way we can continue to improve our services. Please take a few minutes to complete  the written survey that you may receive in the mail after your visit with us. Thank you!             Your Updated Medication List - Protect others around you: Learn how to safely use, store and throw away your medicines at [email or web address].          This list is accurate as of: 12/27/17 10:45 AM.  Always use your most recent med list.                   Brand Name Dispense Instructions for use Diagnosis    amLODIPine 5 MG tablet    NORVASC     Take 5 mg by mouth every morning        bacitracin 500 UNIT/GM Oint     30 g    Apply topically 2 times daily    Male urinary stress incontinence       bisacodyl 5 MG EC tablet      Take 15 mg by mouth every 3 days        ciprofloxacin 500 MG tablet    CIPRO    30 tablet    Take 1 tablet (500 mg) by mouth every 24 hours    Male urinary stress incontinence       clonazePAM 1 MG tablet    klonoPIN     Take 0.5-1 mg by mouth        Doxycycline Hyclate 100 MG Tbec      Take 100 mg by mouth 2 times daily        levothyroxine 100 MCG tablet    SYNTHROID/LEVOTHROID     Take 100 mcg by mouth every morning        metFORMIN 500 MG tablet    GLUCOPHAGE     Take 500 mg by mouth 2 times daily (with meals)        mupirocin 2 % ointment    BACTROBAN     Apply topically as needed        oxyCODONE IR 5 MG tablet    ROXICODONE    30 tablet    Take 1 tablet (5 mg) by mouth every 4 hours as needed for pain    Male urinary stress incontinence       potassium citrate 15 MEQ (1620 MG) Tbcr      Take 1 tablet by mouth 2 times daily        rosuvastatin 10 MG tablet    CRESTOR     Take 10 mg by mouth every morning        senna-docusate 8.6-50 MG per tablet    SENOKOT-S;PERICOLACE    100 tablet    Take 1 tablet by mouth 2 times daily as needed for constipation    Male urinary stress incontinence       tolterodine 4 MG 24 hr capsule    DETROL LA    30 capsule    Take 1 capsule (4 mg) by mouth daily    Male urinary stress incontinence

## 2017-12-27 NOTE — PROGRESS NOTES
Uday Montilla comes into clinic today at the request of Dr Blane Pérez for Pt Teaching stanley catheter.    . Uday Montilla is a 82 year old male who underwent Removal of control pump, pressure regulating balloon and cuff.  Replacement Of Artificial Urinary Sphincter control pump and pressure regulating balloon, urethral repair, cystoscopy on 12.21.17 with Dr. Pérez for a history of male stress urinary incontinence.     Patient changed his catheter bag from the leg bag to the night bag last night. However, the night bag kept slipping out of the end of the catheter. He is very frustrated and would like some help.    We discussed his stanley and changing the leg bag to the night bag. Patient demonstrated proper technique. It looks like the stanley securement device does not fit the catheter properly. We removed that device and gave him 2 different securement devices to try. We also discussed where to place the device so it would not pull the bag from the catheter. Also gave him the Caring For Your Stanley Catheter pamphlet.    He will call in the morning if he has any troubles.    Denies fever, chills, nausea or vomiting.  No pain and denies using pain medications.  Incision is clean, dry and intact. No drainage, redness or warmth.  Urine is clear, yellow.    Gave patient new appointment date and time.    Patient verbalized understanding. No further questions.       This service provided today was under the supervising provider of the day Becca Calabrese PA-C, who was available if needed.    Brittany Mayer, RN-BC, BSN  Care Coordinator - Reconstructive Urology

## 2018-01-08 ENCOUNTER — TELEPHONE (OUTPATIENT)
Dept: UROLOGY | Facility: CLINIC | Age: 83
End: 2018-01-08

## 2018-01-08 ENCOUNTER — NURSE TRIAGE (OUTPATIENT)
Dept: NURSING | Facility: CLINIC | Age: 83
End: 2018-01-08

## 2018-01-08 NOTE — TELEPHONE ENCOUNTER
Patient called and having symptoms .  Feeling like his catheter is not working. Urine is stuck in the tube  I explained to him he has to drink more fluids to assist the gravity stanley system   He had taken a nap and had the bag on his leg and he had a bladder spasm and wet his clothes.  Told him to push catheter in gently one inch to see if that improves drainage push fluids .  He may go to ED again tonight if no urine is in bag. Shaina Dasilva LPN Staff Nurse

## 2018-01-08 NOTE — TELEPHONE ENCOUNTER
Reason for Disposition    [1] Catheter is broken AND [2] is not usable    Additional Information    Negative: Fever > 100.5 F (38.1 C)    Negative: [1] Drinking very little AND [2] dehydration suspected (e.g., no urine > 12 hours, very dry mouth, very lightheaded)    Negative: Patient sounds very sick or weak to the triager    Negative: [1] Catheter was accidentally pulled-out AND [2] bright red continuous bleeding    Negative: SEVERE abdominal pain    Protocols used: URINARY CATHETER SYMPTOMS AND QUESTIONS-ADULT-AH  Emptied urinary leg bad two hours ago, then woke from a nap with clothes soaked in urine. No kinks in tubing.  Urine in tubing but none in BAG.  Khadijah Farah RN  [facility]

## 2018-01-09 ENCOUNTER — ALLIED HEALTH/NURSE VISIT (OUTPATIENT)
Dept: UROLOGY | Facility: CLINIC | Age: 83
End: 2018-01-09
Payer: COMMERCIAL

## 2018-01-09 DIAGNOSIS — R32 URINARY INCONTINENCE: Primary | ICD-10-CM

## 2018-01-09 NOTE — PATIENT INSTRUCTIONS
It was a pleasure meeting with you today.  Thank you for allowing me and my team the privilege of caring for you today.  YOU are the reason we are here, and I truly hope we provided you with the excellent service you deserve.  Please let us know if there is anything else we can do for you so that we can be sure you are leaving completely satisfied with your care experience.      Davonte Katz LPN

## 2018-01-09 NOTE — NURSING NOTE
Patient presented with questions about leg bag/catheter management.      We discussed the challenges he has been having with leakage due to improper seal between catheter and bag tubing.  We decided the best option for him is to leave the leg bag in place at all times, and we taped the tubing in place to permanently seal a crack that had developed in the catheter end distal to the patient. He states that he normally gets up at least once per night anyway and will empty the bag at that time to allow proper drainage.    He is returning to the clinic for follow up with his provider in less than 2 weeks.    Instructed to call clinic with problems.  I attached new leg bag/straps and night bag in the event he changes his mind and decides to switch to it.  Also given several rolls of tape.    Davonte Katz LPN

## 2018-01-09 NOTE — MR AVS SNAPSHOT
After Visit Summary   1/9/2018    Uday Montilla    MRN: [id number]           Patient Information     Date Of Birth          1935        Visit Information        Provider Department      1/9/2018 10:40 AM Nurse,  Prostate Cancer Ctr [facility] Inst for Prostate and Urologic Cancers        Today's Diagnoses     Urinary incontinence    -  1      Care Instructions    It was a pleasure meeting with you today.  Thank you for allowing me and my team the privilege of caring for you today.  YOU are the reason we are here, and I truly hope we provided you with the excellent service you deserve.  Please let us know if there is anything else we can do for you so that we can be sure you are leaving completely satisfied with your care experience.      Davonte Katz LPN          Follow-ups after your visit        Your next 10 appointments already scheduled     Jan 22, 2018  1:00 PM CST   XR CYSTOGRAM VOIDING with UCXR2, CAM GIGU RAD   [facility] ([facility])    [address] [place] [address]  [address] [facility] [address] [phone]           Please bring a list of your current medicines to your exam. (Include vitamins, minerals and over-thecounter medicines.) Leave your valuables at home.  Tell your doctor if there is a chance you may be pregnant.  You do not need to do anything special for this exam.            Jan 22, 2018  2:00 PM CST   (Arrive by 1:45 PM)   Post-Op with Blane Pérez MD   [facility] and [facility] for Prostate and Urologic Cancers ([facility])    [address] [place] [address]  4th Floor  [facility] [place] [facility] [address] [phone]              Who to contact     Please call your clinic at [phone] to:    Ask questions about your health    Make or cancel appointments    Discuss your medicines    Learn about your test results    Speak to your doctor   If you have compliments or concerns about an  experience at your clinic, or if you wish to file a complaint, please contact [facility] Patient Relations at [phone] or email us at [email or web address]         Additional Information About Your Visit        [employer] Information     Stream[employer] gives you secure access to your electronic health record. If you see a primary care provider, you can also send messages to your care team and make appointments. If you have questions, please call your primary care clinic.  If you do not have a primary care provider, please call [phone] and they will assist you.      [employer] is an electronic gateway that provides easy, online access to your medical records. With [employer], you can request a clinic appointment, read your test results, renew a prescription or communicate with your care team.     To access your existing account, please contact your [facility] or call [phone] for assistance.        Care EveryWhere ID     This is your Care EveryWhere ID. This could be used by other organizations to access your [place] medical records  [id number]         Blood Pressure from Last 3 Encounters:   12/25/17 177/73   12/22/17 146/72   12/14/17 165/75    Weight from Last 3 Encounters:   12/21/17 99.9 kg (220 lb 3.8 oz)   12/14/17 100.2 kg (220 lb 12.8 oz)   11/20/17 100 kg (220 lb 6.4 oz)              Today, you had the following     No orders found for display       Primary Care Provider Fax #    Provider Not In System [phone]                Equal Access to Services     GUS SANDY : Hadii aad ku hadasho Soomaali, waaxda luqadaha, qaybta kaalmada adeegyada, elaine ibrahim . So [facility] [phone].    ATENCIÓN: Si habla español, tiene a alvarez disposición servicios gratuitos de asistencia lingüística. Llame al [phone].    We comply with applicable federal civil rights laws and [place] laws. We do not discriminate on the basis  of race, color, national origin, age, disability, sex, sexual orientation, or gender identity.            Thank you!     Thank you for choosing [facility] UROLOGY AND [facility] FOR PROSTATE AND UROLOGIC CANCERS  for your care. Our goal is always to provide you with excellent care. Hearing back from our patients is one way we can continue to improve our services. Please take a few minutes to complete the written survey that you may receive in the mail after your visit with us. Thank you!             Your Updated Medication List - Protect others around you: Learn how to safely use, store and throw away your medicines at www.disposemymeds.org.          This list is accurate as of: 1/9/18 11:56 AM.  Always use your most recent med list.                   Brand Name Dispense Instructions for use Diagnosis    amLODIPine 5 MG tablet    NORVASC     Take 5 mg by mouth every morning        bacitracin 500 UNIT/GM Oint     30 g    Apply topically 2 times daily    Male urinary stress incontinence       bisacodyl 5 MG EC tablet      Take 15 mg by mouth every 3 days        ciprofloxacin 500 MG tablet    CIPRO    30 tablet    Take 1 tablet (500 mg) by mouth every 24 hours    Male urinary stress incontinence       clonazePAM 1 MG tablet    klonoPIN     Take 0.5-1 mg by mouth        Doxycycline Hyclate 100 MG Tbec      Take 100 mg by mouth 2 times daily        levothyroxine 100 MCG tablet    SYNTHROID/LEVOTHROID     Take 100 mcg by mouth every morning        metFORMIN 500 MG tablet    GLUCOPHAGE     Take 500 mg by mouth 2 times daily (with meals)        mupirocin 2 % ointment    BACTROBAN     Apply topically as needed        oxyCODONE IR 5 MG tablet    ROXICODONE    30 tablet    Take 1 tablet (5 mg) by mouth every 4 hours as needed for pain    Male urinary stress incontinence       potassium citrate 15 MEQ (1620 MG) Tbcr      Take 1 tablet by mouth 2 times daily        rosuvastatin 10 MG tablet    CRESTOR     Take 10 mg by mouth every  morning        senna-docusate 8.6-50 MG per tablet    SENOKOT-S;PERICOLACE    100 tablet    Take 1 tablet by mouth 2 times daily as needed for constipation    Male urinary stress incontinence       tolterodine 4 MG 24 hr capsule    DETROL LA    30 capsule    Take 1 capsule (4 mg) by mouth daily    Male urinary stress incontinence

## 2018-01-13 NOTE — OP NOTE
PREOPERATIVE DIAGNOSIS: male stress urinary incontinence.     POSTOPERATIVE DIAGNOSIS: male stress urinary incontinence.     PROCEDURES PERFORMED:   1. Artificial urinary sphincter removal   2. artificial urinary sphincter replacement.  61-70 cm H2O pressure regulating balloon placed through subinguinal incision. Scrotal pump placed on patients left side.   3. Cystoscopy.     SURGEON: Blane Pérez MD was present for the entire procedure.     RESIDENT: none    ESTIMATED BLOOD LOSS: 25 mL.     TUBES AND DRAINS: A 12-Lao Quintanilla catheter with 10 mL and the balloon   was left in place.     BRIEF HISTORY OF PRESENT ILLNESS: Uday Montilla is a 82 year old-year-old   gentleman with urinary incontinence refractory to minimally invasive management. Etiology of incontinence is RP. Prior failed treatments for incontinence have included AUS placed by me several years ago which now has recurrent leakage. Risks, benefits and alternatives of AUS removal and placement were discussed including but not limited to bleeding, infection, penile/scrotal pain/numbness, device erosion, urinary retention, persistent urinary incontinence and need for additional procedures.    DESCRIPTION OF PROCEDURE: After informed consent was obtained and pre-operative antibiotics were given in the form of vancomycin and gentamycin, the patient was brought to the operating room. Under general anesthesia he was placed in the low lithotomy position with all pressure points well-padded. The lower abdomen, penis, scrotum and perineum were prepped and draped in the standard sterile fashion.     A vertical midline perineal incision was made and carried down through Colle's fascia. The bulbospongiosus muscles were divided in the midline and dissected off the corpus spongiosum. The pseudocapsule of the old cuff was entered and the cuff untethered. The cuff was removed and the underlying urethra was noted to be thin consistent with sub-cuff atrophy.    We then dissected the urethra more proximally, trying to get to a healthy area in the very proximal bulbar urethra. But there was scarring present here and the urethra was entered on the left side. Cystoscopy confirmed that the urethra had a very small hole in it at the curve of the urethra to the membranous urethra. It was therefore not safe to place a cuff at this time. A 16F coude catheter was easily placed.  In order to set him up for a simpler cuff replacement in the future we then proceeded to change out the PRB and the pump.   A subinguinal incision was made on the left side and the tubing to from the cuff to the pump and PRB to the pump were isolated. The tubing to the PRB was cut and 22cc saline was removed, consistent with an intact PRB. The PRB was removed. The pump was removed through the same sub-inguinal incision.   The three spaces were all copiously irrigated with antibiotic-saline solution. A new 61-70 cm H20 PRB was placed in the retropubic in the deflated state then inflated with 22cc NS. The pump was placed in the old sub dartos pouch on the  left side using blunt dissection through the subinguinal wound. A purse string stitch of 3-0 vicryl was used. Connection between the PRB and the pump was then done in the standard fashion in the subinguinal wound. The clear tubing exiting the pump was then capped with a metal plug and the 2-0 nylon suture on it was left long. The subinguinal wound was closed in 2 layers with 3-0 Vicryl on Cliff's fascia and a 4-0 Monocryl subcuticular stitch. Dermabond was applied. The perineal wound was closed in 2 layers with 3-0 Vicryl on Colle's fascia and 4-0 Vicryl interrupted on the skin. Dermabond was not applied. Fluffs and a scrotal support were applied.   Patient was awakened from anesthesia and I discussed results with him in PACU.   We will plan to do a VCUG in 1 month to confirm that the urethra has healed. We can then remove the catheter and do cystoscopy  "1-2 months later and then proceed with cuff placement. I will plan to place the next cuff more distally and that may need to be transcorporal if the urethra is not wide. We will have to pass the tubing up into the sub-inguinal wound and make the connection there.   6' 0\"  220 lbs 3.83 oz  Body mass index is 29.87 kg/(m^2).      "

## 2018-01-16 ENCOUNTER — PRE VISIT (OUTPATIENT)
Dept: UROLOGY | Facility: CLINIC | Age: 83
End: 2018-01-16

## 2018-01-16 NOTE — TELEPHONE ENCOUNTER
Pt coming in for a post op - removal and replacement of an AUS. All records available. No need for a call.

## 2018-01-22 ENCOUNTER — ALLIED HEALTH/NURSE VISIT (OUTPATIENT)
Dept: UROLOGY | Facility: CLINIC | Age: 83
End: 2018-01-22
Payer: COMMERCIAL

## 2018-01-22 ENCOUNTER — OFFICE VISIT (OUTPATIENT)
Dept: UROLOGY | Facility: CLINIC | Age: 83
End: 2018-01-22
Payer: COMMERCIAL

## 2018-01-22 ENCOUNTER — RADIANT APPOINTMENT (OUTPATIENT)
Dept: GENERAL RADIOLOGY | Facility: CLINIC | Age: 83
End: 2018-01-22
Attending: UROLOGY
Payer: COMMERCIAL

## 2018-01-22 VITALS
HEART RATE: 64 BPM | WEIGHT: 222.9 LBS | HEIGHT: 72 IN | BODY MASS INDEX: 30.19 KG/M2 | DIASTOLIC BLOOD PRESSURE: 88 MMHG | SYSTOLIC BLOOD PRESSURE: 172 MMHG

## 2018-01-22 DIAGNOSIS — Z98.890 HISTORY OF URETHROTOMY: ICD-10-CM

## 2018-01-22 DIAGNOSIS — N39.3 MALE URINARY STRESS INCONTINENCE: Primary | ICD-10-CM

## 2018-01-22 RX ORDER — IOPAMIDOL 510 MG/ML
150 INJECTION, SOLUTION INTRAVASCULAR ONCE
Status: COMPLETED | OUTPATIENT
Start: 2018-01-22 | End: 2018-01-22

## 2018-01-22 RX ADMIN — IOPAMIDOL 150 ML: 510 INJECTION, SOLUTION INTRAVASCULAR at 13:16

## 2018-01-22 ASSESSMENT — PAIN SCALES - GENERAL: PAINLEVEL: NO PAIN (0)

## 2018-01-22 NOTE — PROGRESS NOTES
Urology Clinic Note  Author: Harpal Grullon MD    Date: 1/22/2018  Time: 2:33 PM  Patient: Uday Montilla  MRN: [id number]    HPI/Subjective: Uday Montilla is a 82 year old male with ***.  {HE OR KQK6GJQ:824875} was last seen in {MONTHS OF THE YEAR:109888} {YEAR:109889} and {he / she - lower case:173562} returns today for follow up.  At the patient's last visit, {he / she - lower case:638752} was ***.    Objective:  /88  Pulse 64  Ht 1.829 m (6')  Wt 101.1 kg (222 lb 14.4 oz)  BMI 30.23 kg/m2  Gen: In NAD, ***.  Resp: Breathing non-labored***  CV: Regular rate; extremities warm, ***.  Abd: Soft, non-***distended, non-***tender***.  Ext: Warm and well perfused***    Assessment & Plan: Uday Montilla is a 82 year old male ***     - ***   - ***    This patient was seen & discussed with ***.    --    Harpal Grullon MD  Urology Resident  pgr: [phone]    2:33 PM, 1/22/2018

## 2018-01-22 NOTE — NURSING NOTE
Pt given Spann clamp and instructed per protocol on use.    Uday was able to demonstrate clamp placement and removal.    He expressed understanding of when to remove the clamp.

## 2018-01-22 NOTE — MR AVS SNAPSHOT
After Visit Summary   1/22/2018    Uday Montilla    MRN: [id number]           Patient Information     Date Of Birth          1935        Visit Information        Provider Department      1/22/2018 3:30 PM Nurse, [facility] Prostate Cancer C[facility] and Inst for Prostate and Urologic Cancers        Today's Diagnoses     Male urinary stress incontinence    -  1       Follow-ups after your visit        Your next 10 appointments already scheduled     Mar 08, 2018   Procedure with Blane Pérez MD   [facility], [place], Same Day Surgery (--)    [address] [facility] [id number] [phone]            Mar 26, 2018  4:30 PM CDT   (Arrive by 4:15 PM)   Post-Op with Becca Calabrese PA-C   [facility] and Inst for Prostate and Urologic Cancers ([facility] [place])    [address] [place] [address] [phone]              Who to contact     Please call your clinic at [phone] to:    Ask questions about your health    Make or cancel appointments    Discuss your medicines    Learn about your test results    Speak to your doctor   If you have compliments or concerns about an experience at your clinic, or if you wish to file a complaint, please contact [facility] Patient Relations at [phone] or email us at [email or web address]         Additional Information About Your Visit        MyChart Information     [employer] gives you secure access to your electronic health record. If you see a primary care provider, you can also send messages to your care team and make appointments. If you have questions, please call your primary care clinic.  If you do not have a primary care provider, please call [phone] and they will assist you.      [employer] is an electronic gateway that provides easy, online access to your medical records. With [employer], you can request a clinic appointment, read your test  results, renew a prescription or communicate with your care team.     To access your existing account, please contact your [facility] or call [phone] for assistance.        Care EveryWhere ID     This is your Care EveryWhere ID. This could be used by other organizations to access your [place] medical records  [id number]         Blood Pressure from Last 3 Encounters:   01/22/18 172/88   12/25/17 177/73   12/22/17 146/72    Weight from Last 3 Encounters:   01/22/18 101.1 kg (222 lb 14.4 oz)   12/21/17 99.9 kg (220 lb 3.8 oz)   12/14/17 100.2 kg (220 lb 12.8 oz)              Today, you had the following     No orders found for display       Primary Care Provider Fax #    Provider Not In System [phone]                Equal Access to Services     GUS SANDY : Olman Leavitt, wanathan best, joanne kimballalyenni avila, elaine ibrahim . So [facility] [phone].    ATENCIÓN: Si habla español, tiene a alvarez disposición servicios gratuitos de asistencia lingüística. Johnnie al [phone].    We comply with applicable federal civil rights laws and [place] laws. We do not discriminate on the basis of race, color, national origin, age, disability, sex, sexual orientation, or gender identity.            Thank you!     Thank you for choosing [facility] UROLOGY AND [facility] FOR PROSTATE AND UROLOGIC CANCERS  for your care. Our goal is always to provide you with excellent care. Hearing back from our patients is one way we can continue to improve our services. Please take a few minutes to complete the written survey that you may receive in the mail after your visit with us. Thank you!             Your Updated Medication List - Protect others around you: Learn how to safely use, store and throw away your medicines at www.disposemymeds.org.          This list is accurate as of: 1/22/18  6:21 PM.  Always use your most recent med list.                   Brand  Name Dispense Instructions for use Diagnosis    amLODIPine 5 MG tablet    NORVASC     Take 5 mg by mouth every morning        bacitracin 500 UNIT/GM Oint     30 g    Apply topically 2 times daily    Male urinary stress incontinence       bisacodyl 5 MG EC tablet      Take 15 mg by mouth every 3 days        clonazePAM 1 MG tablet    klonoPIN     Take 0.5-1 mg by mouth        Doxycycline Hyclate 100 MG Tbec      Take 100 mg by mouth 2 times daily        levothyroxine 100 MCG tablet    SYNTHROID/LEVOTHROID     Take 100 mcg by mouth every morning        metFORMIN 500 MG tablet    GLUCOPHAGE     Take 500 mg by mouth 2 times daily (with meals)        mupirocin 2 % ointment    BACTROBAN     Apply topically as needed        oxyCODONE IR 5 MG tablet    ROXICODONE    30 tablet    Take 1 tablet (5 mg) by mouth every 4 hours as needed for pain    Male urinary stress incontinence       potassium citrate 15 MEQ (1620 MG) Tbcr      Take 1 tablet by mouth 2 times daily        rosuvastatin 10 MG tablet    CRESTOR     Take 10 mg by mouth every morning        senna-docusate 8.6-50 MG per tablet    SENOKOT-S;PERICOLACE    100 tablet    Take 1 tablet by mouth 2 times daily as needed for constipation    Male urinary stress incontinence

## 2018-01-22 NOTE — PROGRESS NOTES
Uday Montilla is a 82 year old male who is 4 weeks s/p AUS removal followed by replacement of only the pressure regulating balloon and the pump.  We did not replace the cuff because there was entry into the urethra during surgery.    He has been healing well with an indwelling urethral catheter for the last month and comes in today with a voiding cystourethrogram and the catheter having been removed.    Pain is much better after removal of the urethral catheter  Appetite is normal  Bowel movements are normal  Urination is incontinent per penis now the catheter is removed    Vital signs reviewed    Exam:  Incision clean, dry, intact  No tenderness or evidence of cellulitis  No hematoma  Sutures are intact  There is diffuse redness of the scrotal skin from the urinary dermatitis     voiding cystourethrogram demonstrates The urethra is well-healed    A/P   Excellent outcome after artificial urinary sphincter revision.  We will plan to replace a new cuff in 6-8 weeks from now.  At that time it will be necessary to make a left inguinal incision in order to locate the tubing that the cuff will connect to.  Cuff placement will be trans-corporal and will be in the distal bulbar urethra.

## 2018-01-22 NOTE — MR AVS SNAPSHOT
After Visit Summary   1/22/2018    Uday Montilla    MRN: [id number]           Patient Information     Date Of Birth          1935        Visit Information        Provider Department      1/22/2018 2:00 PM Blane Pérez MD [facility] and [facility]        Today's Diagnoses     Male urinary stress incontinence    -  1      Care Instructions    When using the Cunningham Clamp make sure the notch is on the underside of the penis.    Remove the clamp every 3-4 hours to empty your bladder. Do this by squeezing the wires together.    Remove the clamp if your penis becomes discolored or it becomes painful to wear.    Do not use the clamp overnight when sleeping.    It was a pleasure meeting with you today.  Thank you for allowing me and my team the privilege of caring for you today.  YOU are the reason we are here, and I truly hope we provided you with the excellent service you deserve.  Please let us know if there is anything else we can do for you so that we can be sure you are leaving completely satisfied with your care experience.                  Follow-ups after your visit        Who to contact     Please call your clinic at [phone] to:    Ask questions about your health    Make or cancel appointments    Discuss your medicines    Learn about your test results    Speak to your doctor   If you have compliments or concerns about an experience at your clinic, or if you wish to file a complaint, please contact [facility] Patient Relations at [phone] or email us at [email or web address]         Additional Information About Your Visit        MyChart Information     [employer] gives you secure access to your electronic health record. If you see a primary care provider, you can also send messages to your care team and make appointments. If you have questions, please call your primary care clinic.  If you do not have  a primary care provider, please call [phone] and they will assist you.      [employer] is an electronic gateway that provides easy, online access to your medical records. With [employer], you can request a clinic appointment, read your test results, renew a prescription or communicate with your care team.     To access your existing account, please contact your [facility] or call [phone] for assistance.        Care EveryWhere ID     This is your Care EveryWhere ID. This could be used by other organizations to access your [place] medical records  [id number]        Your Vitals Were     Pulse Height BMI (Body Mass Index)             64 1.829 m (6') 30.23 kg/m2          Blood Pressure from Last 3 Encounters:   01/22/18 172/88   12/25/17 177/73   12/22/17 146/72    Weight from Last 3 Encounters:   01/22/18 101.1 kg (222 lb 14.4 oz)   12/21/17 99.9 kg (220 lb 3.8 oz)   12/14/17 100.2 kg (220 lb 12.8 oz)              We Performed the Following     Maranda-Operative Worksheet  (Urology General)        Primary Care Provider Fax #    Provider Not In System [phone]                Equal Access to Services     GUS SANDY : Hadii lala perdomoo Sobonnie, waaxda luqadaha, qaybta kaalmada adeegyada, elaine lima. So [facility] [phone].    ATENCIÓN: Si habla español, tiene a alvarez disposición servicios gratuitos de asistencia lingüística. Llame al [phone].    We comply with applicable federal civil rights laws and [place] laws. We do not discriminate on the basis of race, color, national origin, age, disability, sex, sexual orientation, or gender identity.            Thank you!     Thank you for choosing [facility] AND [facility] PROSTATE [facility] UROLOGIC CANCERS  for your care. Our goal is always to provide you with excellent care. Hearing back from our patients is one way we can continue to improve our services. Please take a few minutes to complete the  written survey that you may receive in the mail after your visit with us. Thank you!             Your Updated Medication List - Protect others around you: Learn how to safely use, store and throw away your medicines at [email or web address].          This list is accurate as of: 1/22/18  3:38 PM.  Always use your most recent med list.                   Brand Name Dispense Instructions for use Diagnosis    amLODIPine 5 MG tablet    NORVASC     Take 5 mg by mouth every morning        bacitracin 500 UNIT/GM Oint     30 g    Apply topically 2 times daily    Male urinary stress incontinence       bisacodyl 5 MG EC tablet      Take 15 mg by mouth every 3 days        clonazePAM 1 MG tablet    klonoPIN     Take 0.5-1 mg by mouth        Doxycycline Hyclate 100 MG Tbec      Take 100 mg by mouth 2 times daily        levothyroxine 100 MCG tablet    SYNTHROID/LEVOTHROID     Take 100 mcg by mouth every morning        metFORMIN 500 MG tablet    GLUCOPHAGE     Take 500 mg by mouth 2 times daily (with meals)        mupirocin 2 % ointment    BACTROBAN     Apply topically as needed        oxyCODONE IR 5 MG tablet    ROXICODONE    30 tablet    Take 1 tablet (5 mg) by mouth every 4 hours as needed for pain    Male urinary stress incontinence       potassium citrate 15 MEQ (1620 MG) Tbcr      Take 1 tablet by mouth 2 times daily        rosuvastatin 10 MG tablet    CRESTOR     Take 10 mg by mouth every morning        senna-docusate 8.6-50 MG per tablet    SENOKOT-S;PERICOLACE    100 tablet    Take 1 tablet by mouth 2 times daily as needed for constipation    Male urinary stress incontinence

## 2018-01-22 NOTE — PATIENT INSTRUCTIONS
When using the Cunningham Clamp make sure the notch is on the underside of the penis.    Remove the clamp every 3-4 hours to empty your bladder. Do this by squeezing the wires together.    Remove the clamp if your penis becomes discolored or it becomes painful to wear.    Do not use the clamp overnight when sleeping.    It was a pleasure meeting with you today.  Thank you for allowing me and my team the privilege of caring for you today.  YOU are the reason we are here, and I truly hope we provided you with the excellent service you deserve.  Please let us know if there is anything else we can do for you so that we can be sure you are leaving completely satisfied with your care experience.

## 2018-01-22 NOTE — NURSING NOTE
Chief Complaint   Patient presents with     RECHECK     post op - removal and replacement of an AUS       Blood pressure 172/88, pulse 64, height 1.829 m (6'), weight 101.1 kg (222 lb 14.4 oz). Body mass index is 30.23 kg/(m^2).    Patient Active Problem List   Diagnosis     Male urinary stress incontinence     KRISTIAN (stress urinary incontinence), male     Incontinence       No Known Allergies    Current Outpatient Prescriptions   Medication Sig Dispense Refill     bacitracin 500 UNIT/GM OINT Apply topically 2 times daily 30 g 1     senna-docusate (SENOKOT-S;PERICOLACE) 8.6-50 MG per tablet Take 1 tablet by mouth 2 times daily as needed for constipation 100 tablet 0     oxyCODONE IR (ROXICODONE) 5 MG tablet Take 1 tablet (5 mg) by mouth every 4 hours as needed for pain 30 tablet 0     amLODIPine (NORVASC) 5 MG tablet Take 5 mg by mouth every morning        bisacodyl (DULCOLAX) 5 MG EC tablet Take 15 mg by mouth every 3 days        clonazePAM (KLONOPIN) 1 MG tablet Take 0.5-1 mg by mouth       rosuvastatin (CRESTOR) 10 MG tablet Take 10 mg by mouth every morning        potassium citrate 15 MEQ (1620 MG) TBCR Take 1 tablet by mouth 2 times daily        mupirocin (BACTROBAN) 2 % ointment Apply topically as needed        metFORMIN (GLUCOPHAGE) 500 MG tablet Take 500 mg by mouth 2 times daily (with meals)        levothyroxine (SYNTHROID/LEVOTHROID) 100 MCG tablet Take 100 mcg by mouth every morning        Doxycycline Hyclate 100 MG TBEC Take 100 mg by mouth 2 times daily          Social History   Substance Use Topics     Smoking status: Former Smoker     Types: Cigars     Quit date: 12/14/1994     Smokeless tobacco: Never Used     Alcohol use Not on file      Comment: social beer and wine       DONAVON Alvarado  1/22/2018  2:21 PM

## 2018-01-22 NOTE — LETTER
1/22/2018       RE: Uday Montilla  [address]  [place] [address]     Dear Colleague,    Thank you for referring your patient, Uday Montilla, to the [facility] UROLOGY AND INST FOR PROSTATE AND UROLOGIC CANCERS at [facility]. Please see a copy of my visit note below.    Uday Montilla is a 82 year old male who is 4 weeks s/p AUS removal followed by replacement of only the pressure regulating balloon and the pump.  We did not replace the cuff because there was entry into the urethra during surgery.    He has been healing well with an indwelling urethral catheter for the last month and comes in today with a voiding cystourethrogram and the catheter having been removed.    Pain is much better after removal of the urethral catheter  Appetite is normal  Bowel movements are normal  Urination is incontinent per penis now the catheter is removed    Vital signs reviewed    Exam:  Incision clean, dry, intact  No tenderness or evidence of cellulitis  No hematoma  Sutures are intact  There is diffuse redness of the scrotal skin from the urinary dermatitis     voiding cystourethrogram demonstrates The urethra is well-healed    A/P   Excellent outcome after artificial urinary sphincter revision.  We will plan to replace a new cuff in 6-8 weeks from now.  At that time it will be necessary to make a left inguinal incision in order to locate the tubing that the cuff will connect to.  Cuff placement will be trans-corporal and will be in the distal bulbar urethra.      Again, thank you for allowing me to participate in the care of your patient.      Sincerely,    Blane Pérez MD

## 2018-01-23 NOTE — PROGRESS NOTES
Pre Op Teaching Flowsheet       Pre and Post op Patient Education  Relevant Diagnosis:  Male stress urinary incontinence  Teaching Topic:  Pre and post op teaching for insertion of AUS cuff  Person Involved in teaching:  Uday Montilla      Motivation Level:  Asks Questions: Yes  Eager to Learn:  Yes  Cooperative: Yes  Receptive (willing/able to accept information):  Yes  Patient demonstrates understanding of the following:  Date and time of surgery:  3.8.18 at 1110  Location of surgery: [address] [place] [address]  History and Physical and any other testing necessary prior to surgery: Yes  Required time line for completion of History and Physical and any pre-op testing: Yes    NPO Guidelines: NPO per Anesthesia Guidelines    Patient demonstrates understanding of the following:  Patient understands the need for a responsible adult to drive them home and someone to stay with them for the first 24 hours post-operatively: YES   Pre-op bowel prep: no, not needed  Pre-op showering/scrub information with Hibiclens Soap: Yes  Medications to take the day of surgery:  Per PCP  Blood thinner medications discussed and when to stop (if applicable):  Yes  Diabetes medication management (if applicable):  N/A  Discussed pain control after surgery: pain scale, pain medications and pain management techniques  Infection Prevention: Patient demonstrates understanding of the following:  Patient instructed on hand hygiene:  Yes  Surgical procedure site care taught: Yes  Signs and symptoms of infection taught:  Yes  Wound care will be taught at the time of discharge.  Central venous catheter care will be taught at the time of discharge (if applicable).    Post-op follow-up:  Discussed how to contact the hospital, nurse, and clinic scheduling staff if necessary.    Instructional materials used/given/mailed:  [facility] Booklet, post op teaching sheet, Map, Soap, and arrival/location information.    Surgical instructions given to  patient in clinic: Yes.    Instructional Materials given:  Before your surgery packet , Medications to avoid before surgery , Showering or Bathing instructions before surgery  and What to expect after surgery    Post-op appointment/testing scheduled per MD orders: Yes, 3.26.18 at 1630 with Becca Calabrese PA-C    Total time with patient: 10 minutes    Brittany Mayer RN-BC, BSN  Urology Care Coordinator

## 2018-02-12 ENCOUNTER — TELEPHONE (OUTPATIENT)
Dept: UROLOGY | Facility: CLINIC | Age: 83
End: 2018-02-12

## 2018-02-12 NOTE — TELEPHONE ENCOUNTER
Patient called and stated he is so frustrated with his situation with constant leaking from his surgery dec 22.  AUS not working -- will have to have repeat surgery .  He just feels that we should be doing more to make this time easier for him to deal with.  He is going thru many pads and total wet areas in his house like his carpet. Shaina Dasilva LPN Staff Nurse

## 2018-02-28 ENCOUNTER — CARE COORDINATION (OUTPATIENT)
Dept: UROLOGY | Facility: CLINIC | Age: 83
End: 2018-02-28

## 2018-02-28 NOTE — PROGRESS NOTES
Upcoming surgical procedure with Dr Blane Pérez on 3/8/18 at 1650, check in at 1520.   Surgery at [facility] or [facility]  Patient is having a Insertion of artifical urinary sphincter cuff   Patient has a responsible adult to drive home and stay with them for 24 hours: Yes  Pre-op physical completed: YES. Date-3/2/18.  PAC: No  Bowel Prep: No, not needed  Urine culture completed: YES. Date-3/2/18.  NO GROWTH.  Post-operative appointment needed: YES. Date-3/26/18 at 1630 with Becca Calabrese PA-C.  All questions answered.    Patient verbalized understanding. No further questions.      Chayo Adorno RN  Care Coordinator - Reconstructive Urology  [phone]

## 2018-03-02 ENCOUNTER — TRANSFERRED RECORDS (OUTPATIENT)
Dept: HEALTH INFORMATION MANAGEMENT | Facility: CLINIC | Age: 83
End: 2018-03-02

## 2018-03-08 ENCOUNTER — HOSPITAL ENCOUNTER (OUTPATIENT)
Facility: CLINIC | Age: 83
Discharge: HOME | End: 2018-03-08
Attending: UROLOGY | Admitting: UROLOGY
Payer: MEDICARE

## 2018-03-08 ENCOUNTER — ANESTHESIA EVENT (OUTPATIENT)
Dept: SURGERY | Facility: CLINIC | Age: 83
End: 2018-03-08
Payer: MEDICARE

## 2018-03-08 ENCOUNTER — ANESTHESIA (OUTPATIENT)
Dept: SURGERY | Facility: CLINIC | Age: 83
End: 2018-03-08
Payer: MEDICARE

## 2018-03-08 VITALS
OXYGEN SATURATION: 99 % | DIASTOLIC BLOOD PRESSURE: 92 MMHG | WEIGHT: 215.39 LBS | RESPIRATION RATE: 18 BRPM | SYSTOLIC BLOOD PRESSURE: 145 MMHG | TEMPERATURE: 98.2 F | BODY MASS INDEX: 29.21 KG/M2

## 2018-03-08 DIAGNOSIS — N39.3 MALE URINARY STRESS INCONTINENCE: ICD-10-CM

## 2018-03-08 LAB
GLUCOSE BLDC GLUCOMTR-MCNC: 101 MG/DL (ref 70–99)
GLUCOSE BLDC GLUCOMTR-MCNC: 94 MG/DL (ref 70–99)
INR PPP: 1.15 (ref 0.86–1.14)
POTASSIUM SERPL-SCNC: 4 MMOL/L (ref 3.4–5.3)

## 2018-03-08 PROCEDURE — 37000008 ZZH ANESTHESIA TECHNICAL FEE, 1ST 30 MIN: Performed by: UROLOGY

## 2018-03-08 PROCEDURE — 27810169 ZZH OR IMPLANT GENERAL: Performed by: UROLOGY

## 2018-03-08 PROCEDURE — 85610 PROTHROMBIN TIME: CPT | Performed by: ANESTHESIOLOGY

## 2018-03-08 PROCEDURE — 36415 COLL VENOUS BLD VENIPUNCTURE: CPT | Performed by: ANESTHESIOLOGY

## 2018-03-08 PROCEDURE — 27210794 ZZH OR GENERAL SUPPLY STERILE: Performed by: UROLOGY

## 2018-03-08 PROCEDURE — 37000009 ZZH ANESTHESIA TECHNICAL FEE, EACH ADDTL 15 MIN: Performed by: UROLOGY

## 2018-03-08 PROCEDURE — 36000057 ZZH SURGERY LEVEL 3 1ST 30 MIN - UMMC: Performed by: UROLOGY

## 2018-03-08 PROCEDURE — 82962 GLUCOSE BLOOD TEST: CPT

## 2018-03-08 PROCEDURE — 36000059 ZZH SURGERY LEVEL 3 EA 15 ADDTL MIN UMMC: Performed by: UROLOGY

## 2018-03-08 PROCEDURE — 25000128 H RX IP 250 OP 636: Performed by: UROLOGY

## 2018-03-08 PROCEDURE — 25000566 ZZH SEVOFLURANE, EA 15 MIN: Performed by: UROLOGY

## 2018-03-08 PROCEDURE — C1815 PROS, URINARY SPH, IMP: HCPCS | Performed by: UROLOGY

## 2018-03-08 PROCEDURE — 71000027 ZZH RECOVERY PHASE 2 EACH 15 MINS: Performed by: UROLOGY

## 2018-03-08 PROCEDURE — 25000128 H RX IP 250 OP 636: Performed by: NURSE ANESTHETIST, CERTIFIED REGISTERED

## 2018-03-08 PROCEDURE — 40000170 ZZH STATISTIC PRE-PROCEDURE ASSESSMENT II: Performed by: UROLOGY

## 2018-03-08 PROCEDURE — 27210995 ZZH RX 272: Performed by: UROLOGY

## 2018-03-08 PROCEDURE — 25000125 ZZHC RX 250: Performed by: UROLOGY

## 2018-03-08 PROCEDURE — 25000125 ZZHC RX 250: Performed by: NURSE ANESTHETIST, CERTIFIED REGISTERED

## 2018-03-08 PROCEDURE — 84132 ASSAY OF SERUM POTASSIUM: CPT | Performed by: ANESTHESIOLOGY

## 2018-03-08 PROCEDURE — 71000014 ZZH RECOVERY PHASE 1 LEVEL 2 FIRST HR: Performed by: UROLOGY

## 2018-03-08 DEVICE — KIT ACCESSORY UCS AMS 800 720066-01: Type: IMPLANTABLE DEVICE | Site: SCROTUM | Status: FUNCTIONAL

## 2018-03-08 DEVICE — IMPLANTABLE DEVICE: Type: IMPLANTABLE DEVICE | Site: URETHRA | Status: FUNCTIONAL

## 2018-03-08 RX ORDER — FENTANYL CITRATE 50 UG/ML
INJECTION, SOLUTION INTRAMUSCULAR; INTRAVENOUS PRN
Status: DISCONTINUED | OUTPATIENT
Start: 2018-03-08 | End: 2018-03-08

## 2018-03-08 RX ORDER — PROPOFOL 10 MG/ML
INJECTION, EMULSION INTRAVENOUS PRN
Status: DISCONTINUED | OUTPATIENT
Start: 2018-03-08 | End: 2018-03-08

## 2018-03-08 RX ORDER — LIDOCAINE HYDROCHLORIDE 20 MG/ML
INJECTION, SOLUTION INFILTRATION; PERINEURAL PRN
Status: DISCONTINUED | OUTPATIENT
Start: 2018-03-08 | End: 2018-03-08

## 2018-03-08 RX ORDER — AMOXICILLIN 250 MG
1 CAPSULE ORAL 2 TIMES DAILY PRN
Qty: 40 TABLET | Refills: 0 | Status: SHIPPED | OUTPATIENT
Start: 2018-03-08

## 2018-03-08 RX ORDER — LIDOCAINE 40 MG/G
CREAM TOPICAL
Status: DISCONTINUED | OUTPATIENT
Start: 2018-03-08 | End: 2018-03-08 | Stop reason: HOSPADM

## 2018-03-08 RX ORDER — ONDANSETRON 2 MG/ML
INJECTION INTRAMUSCULAR; INTRAVENOUS PRN
Status: DISCONTINUED | OUTPATIENT
Start: 2018-03-08 | End: 2018-03-08

## 2018-03-08 RX ORDER — ONDANSETRON 4 MG/1
4 TABLET, ORALLY DISINTEGRATING ORAL EVERY 30 MIN PRN
Status: DISCONTINUED | OUTPATIENT
Start: 2018-03-08 | End: 2018-03-08 | Stop reason: HOSPADM

## 2018-03-08 RX ORDER — OXYCODONE HYDROCHLORIDE 5 MG/1
5-10 TABLET ORAL
Qty: 18 TABLET | Refills: 0 | Status: SHIPPED | OUTPATIENT
Start: 2018-03-08

## 2018-03-08 RX ORDER — NALOXONE HYDROCHLORIDE 0.4 MG/ML
.1-.4 INJECTION, SOLUTION INTRAMUSCULAR; INTRAVENOUS; SUBCUTANEOUS
Status: DISCONTINUED | OUTPATIENT
Start: 2018-03-08 | End: 2018-03-08 | Stop reason: HOSPADM

## 2018-03-08 RX ORDER — MEPERIDINE HYDROCHLORIDE 25 MG/ML
12.5 INJECTION INTRAMUSCULAR; INTRAVENOUS; SUBCUTANEOUS
Status: DISCONTINUED | OUTPATIENT
Start: 2018-03-08 | End: 2018-03-08 | Stop reason: HOSPADM

## 2018-03-08 RX ORDER — SODIUM CHLORIDE, SODIUM LACTATE, POTASSIUM CHLORIDE, CALCIUM CHLORIDE 600; 310; 30; 20 MG/100ML; MG/100ML; MG/100ML; MG/100ML
INJECTION, SOLUTION INTRAVENOUS CONTINUOUS PRN
Status: DISCONTINUED | OUTPATIENT
Start: 2018-03-08 | End: 2018-03-08

## 2018-03-08 RX ORDER — FENTANYL CITRATE 50 UG/ML
25-50 INJECTION, SOLUTION INTRAMUSCULAR; INTRAVENOUS
Status: DISCONTINUED | OUTPATIENT
Start: 2018-03-08 | End: 2018-03-08 | Stop reason: HOSPADM

## 2018-03-08 RX ORDER — ONDANSETRON 2 MG/ML
4 INJECTION INTRAMUSCULAR; INTRAVENOUS EVERY 30 MIN PRN
Status: DISCONTINUED | OUTPATIENT
Start: 2018-03-08 | End: 2018-03-08 | Stop reason: HOSPADM

## 2018-03-08 RX ORDER — GLYCOPYRROLATE 0.2 MG/ML
INJECTION, SOLUTION INTRAMUSCULAR; INTRAVENOUS PRN
Status: DISCONTINUED | OUTPATIENT
Start: 2018-03-08 | End: 2018-03-08

## 2018-03-08 RX ORDER — BACITRACIN 500 [USP'U]/G
OINTMENT OPHTHALMIC PRN
Status: DISCONTINUED | OUTPATIENT
Start: 2018-03-08 | End: 2018-03-08 | Stop reason: HOSPADM

## 2018-03-08 RX ORDER — SODIUM CHLORIDE, SODIUM LACTATE, POTASSIUM CHLORIDE, CALCIUM CHLORIDE 600; 310; 30; 20 MG/100ML; MG/100ML; MG/100ML; MG/100ML
INJECTION, SOLUTION INTRAVENOUS CONTINUOUS
Status: DISCONTINUED | OUTPATIENT
Start: 2018-03-08 | End: 2018-03-08 | Stop reason: HOSPADM

## 2018-03-08 RX ORDER — EPHEDRINE SULFATE 50 MG/ML
INJECTION, SOLUTION INTRAMUSCULAR; INTRAVENOUS; SUBCUTANEOUS PRN
Status: DISCONTINUED | OUTPATIENT
Start: 2018-03-08 | End: 2018-03-08

## 2018-03-08 RX ADMIN — PHENYLEPHRINE HYDROCHLORIDE 100 MCG: 10 INJECTION, SOLUTION INTRAMUSCULAR; INTRAVENOUS; SUBCUTANEOUS at 17:17

## 2018-03-08 RX ADMIN — PROPOFOL 30 MG: 10 INJECTION, EMULSION INTRAVENOUS at 18:03

## 2018-03-08 RX ADMIN — GLYCOPYRROLATE 0.1 MG: 0.2 INJECTION, SOLUTION INTRAMUSCULAR; INTRAVENOUS at 17:59

## 2018-03-08 RX ADMIN — FENTANYL CITRATE 75 MCG: 50 INJECTION, SOLUTION INTRAMUSCULAR; INTRAVENOUS at 17:07

## 2018-03-08 RX ADMIN — PROPOFOL 150 MG: 10 INJECTION, EMULSION INTRAVENOUS at 17:17

## 2018-03-08 RX ADMIN — Medication 5 MG: at 17:38

## 2018-03-08 RX ADMIN — PROPOFOL 20 MG: 10 INJECTION, EMULSION INTRAVENOUS at 18:33

## 2018-03-08 RX ADMIN — GLYCOPYRROLATE 0.1 MG: 0.2 INJECTION, SOLUTION INTRAMUSCULAR; INTRAVENOUS at 17:57

## 2018-03-08 RX ADMIN — FENTANYL CITRATE 25 MCG: 50 INJECTION, SOLUTION INTRAMUSCULAR; INTRAVENOUS at 18:50

## 2018-03-08 RX ADMIN — LIDOCAINE HYDROCHLORIDE 100 MG: 20 INJECTION, SOLUTION INFILTRATION; PERINEURAL at 17:17

## 2018-03-08 RX ADMIN — Medication 10 MG: at 17:43

## 2018-03-08 RX ADMIN — GENTAMICIN SULFATE 240 MG: 40 INJECTION, SOLUTION INTRAMUSCULAR; INTRAVENOUS at 16:59

## 2018-03-08 RX ADMIN — VANCOMYCIN HYDROCHLORIDE 1500 MG: 10 INJECTION, POWDER, LYOPHILIZED, FOR SOLUTION INTRAVENOUS at 16:09

## 2018-03-08 RX ADMIN — ONDANSETRON 4 MG: 2 INJECTION INTRAMUSCULAR; INTRAVENOUS at 17:39

## 2018-03-08 RX ADMIN — GLYCOPYRROLATE 0.2 MG: 0.2 INJECTION, SOLUTION INTRAMUSCULAR; INTRAVENOUS at 18:00

## 2018-03-08 RX ADMIN — FENTANYL CITRATE 25 MCG: 50 INJECTION, SOLUTION INTRAMUSCULAR; INTRAVENOUS at 18:02

## 2018-03-08 RX ADMIN — FENTANYL CITRATE 25 MCG: 50 INJECTION, SOLUTION INTRAMUSCULAR; INTRAVENOUS at 18:33

## 2018-03-08 RX ADMIN — SODIUM CHLORIDE, POTASSIUM CHLORIDE, SODIUM LACTATE AND CALCIUM CHLORIDE: 600; 310; 30; 20 INJECTION, SOLUTION INTRAVENOUS at 17:07

## 2018-03-08 NOTE — IP AVS SNAPSHOT
MRN:[id number]                      After Visit Summary   3/8/2018    Uday Montilla    MRN: [id number]           Thank you!     Thank you for choosing [facility] for your care. Our goal is always to provide you with excellent care. Hearing back from our patients is one way we can continue to improve our services. Please take a few minutes to complete the written survey that you may receive in the mail after you visit with us. Thank you!        Patient Information     Date Of Birth          1935        About your hospital stay     You were admitted on:  March 8, 2018 You last received care in the:  Post Anesthesia Care Unit [facility]    You were discharged on:  March 8, 2018       Who to Call     For medical emergencies, please call 911.  For non-urgent questions about your medical care, please call your primary care provider or clinic, None  For questions related to your surgery, please call your surgery clinic        Attending Provider     Provider Specialty    Blane Pérez MD Urology       Primary Care Provider Fax #    Provider Not In System [phone]      After Care Instructions     Discharge Instructions       Activity:   No lifting over 15 pounds and no strenuous physical activity: for 4 weeks   Do NOT strain with bowel movements.  No soaking/baths/swimming for 4 weeks. Showers are fine. Just let soapy water run over your incisions.  No driving or operating machinery: until the day after procedure   No Alcohol for 24 hours post procedure   Diet Instructions: Resume pre procedure diet   Encourage fluids: Encourage fluids at home to keep urine clear to light pink   Dressing Comments: May remove dressings tomorrow and shower 24 hours after surgery. Keep dressing clean and dry - replace if necessary to protect clothing.     Medications  - Do not take more than 4,000mg of Tylenol (acetaminophen) in any 24 hour period, as this can cause liver damage.  - Take stool  softeners such as Senna while you are using narcotics, but stop if you develop diarrhea.   - Wean yourself off of narcotic pain medications by transitioning to tylenol keeping in mind the total amount of acetaminophen ingested as listed above.    Follow-Up:  - Follow up in the urology clinic as scheduled  - Leave the pump deactivated for the next 3 weeks until you are given approval from Dr. Pérez's team to do so. Your wounds need time to heal before you can safely start to use the device. Avoid any type of sexual activity until after you are seen in the clinic.  - Call your primary care provider to touch base regarding your recent admission.     - Call or return sooner than your regularly scheduled visit if you develop any of the following: fever, uncontrolled pain, uncontrolled nausea or vomiting, as well as increased redness, swelling, or drainage from your wound. You may call the Urology Clinic during daytime hours at [phone]. If after hours, call [phone] and ask to speak with the Urology resident on call.                  Your next 10 appointments already scheduled     Mar 26, 2018  4:30 PM CDT   (Arrive by 4:15 PM)   Post-Op with Becca Calabrese PA-C   [facility] ([facility])    [address] [place] [id number] [phone]              Further instructions from your care team       [facility]  Same-Day Surgery   Adult Discharge Orders & Instructions     For 24 hours after surgery    1. Get plenty of rest.  A responsible adult must stay with you for at least 24 hours after you leave the hospital.   2. Do not drive or use heavy equipment.  If you have weakness or tingling, don't drive or use heavy equipment until this feeling goes away.  3. Do not drink alcohol.  4. Avoid strenuous or risky activities.  Ask for help when climbing stairs.   5. You may feel  lightheaded.  IF so, sit for a few minutes before standing.  Have someone help you get up.   6. If you have nausea (feel sick to your stomach): Drink only clear liquids such as apple juice, ginger ale, broth or 7-Up.  Rest may also help.  Be sure to drink enough fluids.  Move to a regular diet as you feel able.  7. You may have a slight fever. Call the doctor if your fever is over 100 F (37.7 C) (taken under the tongue) or lasts longer than 24 hours.  8. You may have a dry mouth, a sore throat, muscle aches or trouble sleeping.  These should go away after 24 hours.  9. Do not make important or legal decisions.   Call your doctor for any of the followin.  Signs of infection (fever, growing tenderness at the surgery site, a large amount of drainage or bleeding, severe pain, foul-smelling drainage, redness, swelling).    2. It has been over 8 to 10 hours since surgery and you are still not able to urinate (pass water).    3.  Headache for over 24 hours.      To contact a doctor, call Dr Mike's office at [phone] or:        [phone] and ask for the resident on call for Urology (answered 24 hours a day)      Emergency Department:    [facility]: [phone]            Tips for taking pain medications  To get the best pain relief possible , remember these points:      Take pain medications as directed, before pain becomes severe      Pain medication can upset your stomach: taking it with food may help      Constipation is a common side effect of pain medication. Drink plenty of  Fluids      Eat foods high in fiber. Take a stool softener  if recommended by your doctor or  Pharmacist.        Do not drink alcohol, drive or operate machinery while taking pain medications.      Ask about other ways to control pain, such as with heat, ice or relaxation.      Pending Results     No orders found from 3/6/2018 to 3/9/2018.            Admission Information     Date & Time Provider Department Dept. Phone     3/8/2018 Blane Pérez MD Post Anesthesia Care Unit [facility] [phone]      Your Vitals Were     Blood Pressure Temperature Respirations Weight Pulse Oximetry BMI (Body Mass Index)    153/79 98.2  F (36.8  C) (Oral) 14 97.7 kg (215 lb 6.2 oz) 96% 29.21 kg/m2      [employer] Information     [employer] gives you secure access to your electronic health record. If you see a primary care provider, you can also send messages to your care team and make appointments. If you have questions, please call your primary care clinic.  If you do not have a primary care provider, please call [phone] and they will assist you.        Care EveryWhere ID     This is your Care EveryWhere ID. This could be used by other organizations to access your [facility] medical records  [id number]        Equal Access to Services     GUS SANDY : Olman Leavitt, cordell best, elaine cortes . So [facility] [phone].    ATENCIÓN: Si habla español, tiene a alvarez disposición servicios gratuitos de asistencia lingüística. Llrehan al [phone].    We comply with applicable federal civil rights laws and [place] laws. We do not discriminate on the basis of race, color, national origin, age, disability, sex, sexual orientation, or gender identity.               Review of your medicines      START taking        Dose / Directions    oxyCODONE IR 5 MG tablet   Commonly known as:  ROXICODONE   Used for:  Male urinary stress incontinence        Dose:  5-10 mg   Take 1-2 tablets (5-10 mg) by mouth every 3 hours as needed for other (Moderate to Severe Pain)   Quantity:  18 tablet   Refills:  0         CONTINUE these medicines which may have CHANGED, or have new prescriptions. If we are uncertain of the size of tablets/capsules you have at home, strength may be listed as something that might have changed.        Dose / Directions    bacitracin 500 UNIT/GM Oint   This may have  changed:    - when to take this  - reasons to take this   Used for:  Male urinary stress incontinence        Apply topically 2 times daily   Quantity:  30 g   Refills:  1         CONTINUE these medicines which have NOT CHANGED        Dose / Directions    amLODIPine 5 MG tablet   Commonly known as:  NORVASC        Dose:  5 mg   Take 5 mg by mouth every morning   Refills:  0       bisacodyl 5 MG EC tablet        Dose:  15 mg   Take 15 mg by mouth every 3 days   Refills:  0       clonazePAM 1 MG tablet   Commonly known as:  klonoPIN        Dose:  0.5-1 mg   Take 0.5-1 mg by mouth At Bedtime   Refills:  0       Doxycycline Hyclate 100 MG Tbec        Dose:  100 mg   Take 100 mg by mouth 2 times daily   Refills:  0       levothyroxine 100 MCG tablet   Commonly known as:  SYNTHROID/LEVOTHROID        Dose:  100 mcg   Take 100 mcg by mouth every morning   Refills:  0       metFORMIN 500 MG tablet   Commonly known as:  GLUCOPHAGE        Dose:  500 mg   Take 500 mg by mouth 2 times daily (with meals)   Refills:  0       PLAVIX PO        Dose:  75 mg   Take 75 mg by mouth daily   Refills:  0       potassium citrate 15 MEQ (1620 MG) Tbcr        Dose:  1 tablet   Take 1 tablet by mouth 2 times daily   Refills:  0       rosuvastatin 10 MG tablet   Commonly known as:  CRESTOR        Dose:  10 mg   Take 10 mg by mouth every morning   Refills:  0       senna-docusate 8.6-50 MG per tablet   Commonly known as:  SENOKOT-S;PERICOLACE   Used for:  Male urinary stress incontinence        Dose:  1 tablet   Take 1 tablet by mouth 2 times daily as needed for constipation   Quantity:  40 tablet   Refills:  0       VITAMIN D-3 PO        Dose:  5000 Units   Take 5,000 Units by mouth daily   Refills:  0            Where to get your medicines      These medications were sent to [facility] [place], [place] - [address]  [address] [address]     Phone:  [phone]     senna-docusate 8.6-50 MG per  tablet         Some of these will need a paper prescription and others can be bought over the counter. Ask your nurse if you have questions.     Bring a paper prescription for each of these medications     oxyCODONE IR 5 MG tablet                Protect others around you: Learn how to safely use, store and throw away your medicines at www.disposemymeds.org.        Information about OPIOIDS     PRESCRIPTION OPIOIDS: WHAT YOU NEED TO KNOW    Prescription opioids can be used to help relieve moderate to severe pain and are often prescribed following a surgery or injury, or for certain health conditions. These medications can be an important part of treatment but also come with serious risks. It is important to work with your health care provider to make sure you are getting the safest, most effective care.    WHAT ARE THE RISKS AND SIDE EFFECTS OF OPIOID USE?  Prescription opioids carry serious risks of addiction and overdose, especially with prolonged use. An opioid overdose, often marked by slowed breathing can cause sudden death. The use of prescription opioids can have a number of side effects as well, even when taken as directed:      Tolerance - meaning you might need to take more of a medication for the same pain relief    Physical dependence - meaning you have symptoms of withdrawal when a medication is stopped    Increased sensitivity to pain    Constipation    Nausea, vomiting, and dry mouth    Sleepiness and dizziness    Confusion    Depression    Low levels of testosterone that can result in lower sex drive, energy, and strength    Itching and sweating    RISKS ARE GREATER WITH:    History of drug misuse, substance use disorder, or overdose    Mental health conditions (such as depression or anxiety)    Sleep apnea    Older age (65 years or older)    Pregnancy    Avoid alcohol while taking prescription opioids.   Also, unless specifically advised by your health care provider, medications to avoid  include:    Benzodiazepines (such as Xanax or Valium)    Muscle relaxants (such as Soma or Flexeril)    Hypnotics (such as Ambien or Lunesta)    Other prescription opioids    KNOW YOUR OPTIONS:  Talk to your health care provider about ways to manage your pain that do not involve prescription opioids. Some of these options may actually work better and have fewer risks and side effects:    Pain relievers such as acetaminophen, ibuprofen, and naproxen    Some medications that are also used for depression or seizures    Physical therapy and exercise    Cognitive behavioral therapy, a psychological, goal-directed approach, in which patients learn how to modify physical, behavioral, and emotional triggers of pain and stress    IF YOU ARE PRESCRIBED OPIOIDS FOR PAIN:    Never take opioids in greater amounts or more often than prescribed    Follow up with your primary health care provider and work together to create a plan on how to manage your pain.    Talk about ways to help manage your pain that do not involve prescription opioids    Talk about all concerns and side effects    Help prevent misuse and abuse    Never sell or share prescription opioids    Never use another person's prescription opioids    Store prescription opioids in a secure place and out of reach of others (this may include visitors, children, friends, and family)    Visit www.cdc.gov/drugoverdose to learn about risks of opioid abuse and overdose    If you believe you may be struggling with addiction, tell your health care provider and ask for guidance or call [facility]'s National Helpline at 2-972-945-HELP    LEARN MORE / www.cdc.gov/drugoverdose/prescribing/guideline.html    Safely dispose of unused prescription opioids: Find your local drug take-back programs and more information about the importance of safe disposal at www.doseofreality.[place].gov             Medication List: This is a list of all your medications and when to take them. Check marks below  indicate your daily home schedule. Keep this list as a reference.      Medications           Morning Afternoon Evening Bedtime As Needed    amLODIPine 5 MG tablet   Commonly known as:  NORVASC   Take 5 mg by mouth every morning                                bacitracin 500 UNIT/GM Oint   Apply topically 2 times daily                                bisacodyl 5 MG EC tablet   Take 15 mg by mouth every 3 days                                clonazePAM 1 MG tablet   Commonly known as:  klonoPIN   Take 0.5-1 mg by mouth At Bedtime                                Doxycycline Hyclate 100 MG Tbec   Take 100 mg by mouth 2 times daily                                levothyroxine 100 MCG tablet   Commonly known as:  SYNTHROID/LEVOTHROID   Take 100 mcg by mouth every morning                                metFORMIN 500 MG tablet   Commonly known as:  GLUCOPHAGE   Take 500 mg by mouth 2 times daily (with meals)                                oxyCODONE IR 5 MG tablet   Commonly known as:  ROXICODONE   Take 1-2 tablets (5-10 mg) by mouth every 3 hours as needed for other (Moderate to Severe Pain)                                PLAVIX PO   Take 75 mg by mouth daily                                potassium citrate 15 MEQ (1620 MG) Tbcr   Take 1 tablet by mouth 2 times daily                                rosuvastatin 10 MG tablet   Commonly known as:  CRESTOR   Take 10 mg by mouth every morning                                senna-docusate 8.6-50 MG per tablet   Commonly known as:  SENOKOT-S;PERICOLACE   Take 1 tablet by mouth 2 times daily as needed for constipation                                VITAMIN D-3 PO   Take 5,000 Units by mouth daily

## 2018-03-08 NOTE — ANESTHESIA PREPROCEDURE EVALUATION
Anesthesia Evaluation     . Pt has had prior anesthetic.     No history of anesthetic complications          ROS/MED HX    ENT/Pulmonary:     (+)JI risk factors hypertension, , . .    Neurologic:  - neg neurologic ROS     Cardiovascular:     (+) hypertension--CAD, --stent,8/28/2015  2 Bare Metal Stent .. Taking blood thinners : . . . :. . Previous cardiac testing date:results:Stress Testdate:5/2017 results: date: results: date: results:          METS/Exercise Tolerance:  >4 METS   Hematologic:         Musculoskeletal:         GI/Hepatic:  - neg GI/hepatic ROS       Renal/Genitourinary:     (+) Nephrolithiasis ,       Endo:     (+) thyroid problem hypothyroidism, .      Psychiatric:  - neg psychiatric ROS       Infectious Disease:  - neg infectious disease ROS       Malignancy:   (+) Malignancy History of Prostate  Prostate CA Remission status post Surgery and Radiation,         Other:                   Procedure: Procedure(s):  Insertion Of Artifical Urinary Sphincter Cuff - Wound Class: I-Clean    HPI: Uday Montilla is a 82 year old male scheduled for repeat procedure above due to failed first time.    PMHx/PSHx:  Past Medical History:   Diagnosis Date     Antiplatelet or antithrombotic long-term use      Coronary artery disease 2008     Diabetes (H)     Type II     Hyperlipidemia      Hypertension      Hypothyroid      Incontinence of urine 2010     Kidney stones 2016     Prostate cancer (H) 2010     Stented coronary artery        Past Surgical History:   Procedure Laterality Date     ANGIOGRAM  2008,2012,2015    Most recent stents circ and RCA 2015 bare metal stents     AS REMV PROSTATE,RETROPUB,RAD,TOT NODES  2000     AS TOTAL HIP ARTHROPLASTY Left 2013     AS TOTAL HIP ARTHROPLASTY Right 2010     BACK SURGERY  2006    Lower spine     CHOLECYSTECTOMY       CYSTOSCOPY N/A 12/21/2017    Procedure: CYSTOSCOPY;;  Surgeon: Blane Pérez MD;  Location: [facility] OR     prosthetic sphincter  2010      REPLACE PROSTHESIS SPHINCTER URINARY N/A 12/21/2017    Procedure: REPLACE PROSTHESIS SPHINCTER URINARY;  Removal of control pump, pressure regulating balloon and cuff.  Replacement Of Artificial Urinary Sphincter control pump and pressure regulating balloon, Cystoscopy ;  Surgeon: Blane Pérez MD;  Location: [facility]         No current facility-administered medications on file prior to encounter.   Current Outpatient Prescriptions on File Prior to Encounter:  senna-docusate (SENOKOT-S;PERICOLACE) 8.6-50 MG per tablet Take 1 tablet by mouth 2 times daily as needed for constipation   amLODIPine (NORVASC) 5 MG tablet Take 5 mg by mouth every morning    bisacodyl (DULCOLAX) 5 MG EC tablet Take 15 mg by mouth every 3 days    clonazePAM (KLONOPIN) 1 MG tablet Take 0.5-1 mg by mouth At Bedtime    rosuvastatin (CRESTOR) 10 MG tablet Take 10 mg by mouth every morning    potassium citrate 15 MEQ (1620 MG) TBCR Take 1 tablet by mouth 2 times daily    metFORMIN (GLUCOPHAGE) 500 MG tablet Take 500 mg by mouth 2 times daily (with meals)    levothyroxine (SYNTHROID/LEVOTHROID) 100 MCG tablet Take 100 mcg by mouth every morning    Doxycycline Hyclate 100 MG TBEC Take 100 mg by mouth 2 times daily    bacitracin 500 UNIT/GM OINT Apply topically 2 times daily (Patient taking differently: Apply topically 2 times daily as needed )       Social Hx:   Social History   Substance Use Topics     Smoking status: Former Smoker     Types: Cigars     Quit date: 12/14/1994     Smokeless tobacco: Never Used     Alcohol use Yes      Comment: social beer and wine       Allergies: No Known Allergies      NPO Status: Per ASA Guidelines    Labs:    Blood Bank:  Lab Results   Component Value Date    ABO O 11/03/2008    RH  Pos 11/03/2008    AS Neg 11/03/2008     BMP:  Recent Labs   Lab Test  12/21/17   1031  12/14/17   1037   NA   --   137   POTASSIUM  4.1  4.6   CHLORIDE   --   105   CO2   --   27   BUN   --   28   CR  1.14  1.36*   GLC   --    94   MIKI   --   8.7     CBC:   No results for input(s): WBC, RBC, HGB, HCT, MCV, MCH, MCHC, RDW, PLT in the last 20914 hours.  Coags:  No results for input(s): INR, PTT, FIBR in the last 59333 hours.      Physical Exam      Airway   Mallampati: II  TM distance: >3 FB  Neck ROM: full    Dental   (+) missing and implants    Cardiovascular   Rhythm and rate: regular and normal  (+) murmur       Pulmonary    breath sounds clear to auscultation                    Anesthesia Plan      History & Physical Review  History and physical reviewed and following examination; no interval change.    ASA Status:  3 .    NPO Status:  > 6 hours    Plan for General and LMA with Intravenous and Propofol induction. Maintenance will be Balanced.    PONV prophylaxis:  Ondansetron (or other 5HT-3)       Postoperative Care  Postoperative pain management:  IV analgesics.      Consents  Anesthetic plan, risks, benefits and alternatives discussed with:  Patient..                    History and physical assessed; Patient examined.   Risks and alternatives presented and discussed. Patient and family agree. All questions answered.      Toni Zaidi MD  Staff Anesthesiologist  *[phone]

## 2018-03-08 NOTE — IP AVS SNAPSHOT
Post Anesthesia Care Unit [address] [address]    Phone:  [phone]                                       After Visit Summary   3/8/2018    Uday Montilla    MRN: [id number]           After Visit Summary Signature Page     I have received my discharge instructions, and my questions have been answered. I have discussed any challenges I see with this plan with the nurse or doctor.    ..........................................................................................................................................  Patient/Patient Representative Signature      ..........................................................................................................................................  Patient Representative Print Name and Relationship to Patient    ..................................................               ................................................  Date                                            Time    ..........................................................................................................................................  Reviewed by Signature/Title    ...................................................              ..............................................  Date                                                            Time

## 2018-03-09 ENCOUNTER — CARE COORDINATION (OUTPATIENT)
Dept: UROLOGY | Facility: CLINIC | Age: 83
End: 2018-03-09

## 2018-03-09 NOTE — PROGRESS NOTES
Urology Postop Phone Note:    Mr. Uday Montilla is a 82 year old male who underwent Insertion of artifical urinary sphincter cuff on 3/8/18 with Dr. Pérez for a history of male urinary stress incontinence.  His postoperative course was unremarkable and the patient was d/c to home on 3/8/18.    Fevers/chills: Patient denies any fever or chills.  Eating/drinking: Patient is able to eat and drink without any complaints.  Bowel habits: Patient reports no bowel movement since surgery.  Urine output Urethra Color Light Yellow Clarity Clear Odor None  Incisions: Patient denies any signs and symptoms of infection.  Pain: Patient reports pain as a 4 out of 10.  The pain is located at incision site located   Narcotics: The patient denies taking any pain medication.  Antibiotics: No    Follow up appointment scheduled on 3/26/18 at 1630 with Becca Calabrese PA-C.    Informed the patient of the clinic triage nursing # ([phone] option 3) and urology resident on call # for after hours concerns.    Chayo Adorno RN  Care Coordinator - Reconstructive Urology

## 2018-03-09 NOTE — OP NOTE
OPERATIVE REPORT  03/08/2018    PREOPERATIVE DIAGNOSIS:   1. Male stress urinary incontinence.   2. History of urethral injury during AUS explant and replacement    POSTOPERATIVE DIAGNOSIS:   1. Male stress urinary incontinence.   2. History of urethral injury during AUS explant and replacement    PROCEDURES PERFORMED:   1. Artificial urinary sphincter cuff placement. 4.5 cm cuff placed transcorporally through perineal incision and connected to existing pump and PRB.  2. Cystoscopy.     SURGEON:   Blane Pérez MD   FELLOW:   Mark Munoz MD  RESIDENTS:   Alvaro Julian MD; Marek Sauer MD  BLOOD LOSS:  15 mL.   TUBES/DRAINS:  None    BRIEF HISTORY OF PRESENT ILLNESS: Uday Montilla is a(n) 82 year old-year-old gentleman with urinary incontinence refractory to minimally invasive management. Etiology of incontinence is radical prostatectomy. Prior failed treatments for incontinence have included AUS placed several years ago with recurrent leakage. He underwent AUS removal and attempted replacement on 12/21/17 but this was complicated by a urethral injury, so only the PRB and scrotal pump were placed at that time. He now returns today for cuff placement. Risks, benefits and alternatives of AUS placement were discussed including but not limited to bleeding, infection, penile/scrotal pain/numbness, device erosion, urinary retention, persistent urinary incontinence and need for additional procedures.    DESCRIPTION OF PROCEDURE: After informed consent was obtained and pre-operative antibiotics were given in the form of vancomycin and gentamycin, the patient was brought to the operating room. Under general anesthesia he was placed in the low lithotomy position with all pressure points well-padded. The lower abdomen, penis, scrotum and perineum were prepped and draped in the standard sterile fashion.    We began our procedure by performing cystoscopy using a 16 Fr flexible cystoscope. This showed a grossly  normal urethra with no evidence of prior injury. The mucosa appeared well healed in the area of our prior urethrotomy. The prostate was surgically absent and there was a 12 Fr bladder neck contracture which we did not attempt to traverse with the scope. The scope was removed and a 12 Fr tsanley placed with 10cc in the balloon.   A vertical midline perineal incision was made and carried down through Colle's fascia. The bulbospongiosus muscles were divided in the midline and dissected off the corpus spongiosum. We began to circumferentially dissect the bulbar urethra off the underlying corporal bodies, but there was dense scarring in the area of the proximal urethra. We therefore elected to proceed with transcorporal cuff placement. Two 3-0 PDS sutures were placed into each corporal body and longitudinal corporotomies made. The posterior edges of the corporotomies were reapproximated using the PDS sutures. The measuring tape was then passed through the corporotomies and was circled around the urethra. The diameter measured comfortably at 4.5 cm with the stanley catheter removed.  A 4.5 cm cuff was selected and prepared.    A subinguinal incision was made on the left side through the prior incision. The tubing from the previously placed pump was identified and brought out of the incision. The cuff was then passed around the urethra with the tubing exiting on the left side. The curved needle was used to pass the cuff tubing into the subinguinal wound. Connections were then done in the standard fashion in the subinguinal wound. The subinguinal wound was closed in 2 layers with 3-0 Vicryl on Cliff's fascia and a 4-0 Monocryl subcuticular stitch. Dermabond was applied. The perineal wound was closed in 2 layers with 3-0 Vicryl on Colle's fascia and 4-0 Monocryl interrupted on the skin. Dermabond was applied. Fluffs and a scrotal support were applied. The cuff was left deactivated.     Data Unavailable  215 lbs 6.23 oz  Body  mass index is 29.21 kg/(m^2).

## 2018-03-09 NOTE — ANESTHESIA CARE TRANSFER NOTE
Patient: Uday Montilla    Procedure(s):  Insertion Of Artifical Urinary Sphincter Cuff - Wound Class: II-Clean Contaminated    Diagnosis: Male Urinary Stress Incontinence   Diagnosis Additional Information: No value filed.    Anesthesia Type:   General, LMA     Note:  Airway :Face Mask  Patient transferred to:PACU  Comments: Anesthesia Care Transfer Note    Patient: Uday Montilla    Transferred to: PACU    Patient vital signs: stable    Airway: none    Monitors placed. VSS. PIV patent. 8L 02 FM. Patient awake, comfortable. Report given and care transferred to RN.     Floresita Garcia CRNA   3/8/2018  Handoff Report: Identifed the Patient, Identified the Reponsible Provider, Reviewed the pertinent medical history, Discussed the surgical course, Reviewed Intra-OP anesthesia mangement and issues during anesthesia, Set expectations for post-procedure period and Allowed opportunity for questions and acknowledgement of understanding      Vitals: (Last set prior to Anesthesia Care Transfer)    CRNA VITALS  3/8/2018 1843 - 3/8/2018 1917      3/8/2018             Pulse: 77    SpO2: 100 %    Resp Rate (observed): (!)  5    Resp Rate (set): 10                Electronically Signed By: GEOVANNA Zamora CRNA  March 8, 2018  7:17 PM

## 2018-03-09 NOTE — ANESTHESIA POSTPROCEDURE EVALUATION
Patient: Uady Montilla    Procedure(s):  Insertion Of Artifical Urinary Sphincter Cuff - Wound Class: II-Clean Contaminated    Diagnosis:Male Urinary Stress Incontinence   Diagnosis Additional Information: No value filed.    Anesthesia Type:  General, LMA    Note:  Anesthesia Post Evaluation    Patient location during evaluation: PACU  Patient participation: Able to fully participate in evaluation  Level of consciousness: awake and alert  Pain management: adequate  Airway patency: patent  Cardiovascular status: acceptable  Respiratory status: acceptable  Hydration status: acceptable  PONV: none     Anesthetic complications: None          Last vitals:  Vitals:    03/08/18 1915 03/08/18 1930 03/08/18 1945   BP: 142/90 132/76 150/87   Resp: 21 20 18   Temp: 36.3  C (97.3  F) 35.4  C (95.7  F)    SpO2: 100% 96% 98%         Electronically Signed By: Toni Zaidi MD  March 8, 2018  7:54 PM

## 2018-03-09 NOTE — OR NURSING
Urology resident pager [phone] stated pt will need a post-void-residual after void.  This reported to PACU RN Uday Winter.

## 2018-03-09 NOTE — BRIEF OP NOTE
[facility], [place]    Brief Operative Note    Pre-operative diagnosis: Male Urinary Stress Incontinence   Post-operative diagnosis * No post-op diagnosis entered *  Procedure: Procedure(s):  Insertion Of Artifical Urinary Sphincter Cuff - Wound Class: II-Clean Contaminated  Surgeon: Surgeon(s) and Role:     * Blane Pérez MD - Primary     * Mark Munoz MD - Assisting     * Alvaro Julian MD - Resident - Assisting  Anesthesia: General   Estimated blood loss: Minimal  Drains: None  Specimens: * No specimens in log *  Findings:   4.5 cm cuff inserted transcorporally.  Complications: None.  Implants: None.

## 2018-03-09 NOTE — DISCHARGE INSTRUCTIONS
[facility]  Same-Day Surgery   Adult Discharge Orders & Instructions     For 24 hours after surgery    1. Get plenty of rest.  A responsible adult must stay with you for at least 24 hours after you leave the hospital.   2. Do not drive or use heavy equipment.  If you have weakness or tingling, don't drive or use heavy equipment until this feeling goes away.  3. Do not drink alcohol.  4. Avoid strenuous or risky activities.  Ask for help when climbing stairs.   5. You may feel lightheaded.  IF so, sit for a few minutes before standing.  Have someone help you get up.   6. If you have nausea (feel sick to your stomach): Drink only clear liquids such as apple juice, ginger ale, broth or 7-Up.  Rest may also help.  Be sure to drink enough fluids.  Move to a regular diet as you feel able.  7. You may have a slight fever. Call the doctor if your fever is over 100 F (37.7 C) (taken under the tongue) or lasts longer than 24 hours.  8. You may have a dry mouth, a sore throat, muscle aches or trouble sleeping.  These should go away after 24 hours.  9. Do not make important or legal decisions.   Call your doctor for any of the followin.  Signs of infection (fever, growing tenderness at the surgery site, a large amount of drainage or bleeding, severe pain, foul-smelling drainage, redness, swelling).    2. It has been over 8 to 10 hours since surgery and you are still not able to urinate (pass water).    3.  Headache for over 24 hours.      To contact a doctor, call Dr Mike's office at [phone] or:        [phone] and ask for the resident on call for Urology (answered 24 hours a day)      Emergency Department:    [facility]: [phone]            Tips for taking pain medications  To get the best pain relief possible , remember these points:      Take pain medications as directed, before pain becomes severe      Pain medication can upset your stomach: taking it with  food may help      Constipation is a common side effect of pain medication. Drink plenty of  Fluids      Eat foods high in fiber. Take a stool softener  if recommended by your doctor or  Pharmacist.        Do not drink alcohol, drive or operate machinery while taking pain medications.      Ask about other ways to control pain, such as with heat, ice or relaxation.

## 2018-03-19 ENCOUNTER — PRE VISIT (OUTPATIENT)
Dept: UROLOGY | Facility: CLINIC | Age: 83
End: 2018-03-19

## 2018-03-26 ENCOUNTER — OFFICE VISIT (OUTPATIENT)
Dept: UROLOGY | Facility: CLINIC | Age: 83
End: 2018-03-26
Payer: COMMERCIAL

## 2018-03-26 VITALS
WEIGHT: 220 LBS | DIASTOLIC BLOOD PRESSURE: 92 MMHG | BODY MASS INDEX: 29.8 KG/M2 | HEIGHT: 72 IN | SYSTOLIC BLOOD PRESSURE: 161 MMHG | HEART RATE: 85 BPM

## 2018-03-26 DIAGNOSIS — N39.3 MALE URINARY STRESS INCONTINENCE: ICD-10-CM

## 2018-03-26 DIAGNOSIS — Z96.0 STATUS POST IMPLANTATION OF ARTIFICIAL URINARY SPHINCTER: Primary | ICD-10-CM

## 2018-03-26 ASSESSMENT — PAIN SCALES - GENERAL: PAINLEVEL: NO PAIN (0)

## 2018-03-26 NOTE — MR AVS SNAPSHOT
After Visit Summary   3/26/2018    Uday Montilla    MRN: [id number]           Patient Information     Date Of Birth          1935        Visit Information        Provider Department      3/26/2018 4:30 PM Becca Calabrese PA-C [facility] Urology and [facility]        Today's Diagnoses     Status post implantation of artificial urinary sphincter    -  1    Male urinary stress incontinence           Follow-ups after your visit        Your next 10 appointments already scheduled     Apr 02, 2018 11:00 AM CDT   (Arrive by 10:45 AM)   Return Visit with Blane Pérez MD   [facility] Urology and [facility] ([facility])    [address] [address] [phone]              Who to contact     Please call your clinic at [phone] to:    Ask questions about your health    Make or cancel appointments    Discuss your medicines    Learn about your test results    Speak to your doctor            Additional Information About Your Visit        [email or web address] Information     [employer] gives you secure access to your electronic health record. If you see a primary care provider, you can also send messages to your care team and make appointments. If you have questions, please call your primary care clinic.  If you do not have a primary care provider, please call [phone] and they will assist you.      [employer] is an electronic gateway that provides easy, online access to your medical records. With [employer], you can request a clinic appointment, read your test results, renew a prescription or communicate with your care team.     To access your existing account, please contact your [facility] or call [phone] for assistance.        Care EveryWhere ID     This is your Care EveryWhere ID. This could be used by other organizations to access your [facility]  records  [id number]        Your Vitals Were     Pulse Height BMI (Body Mass Index)             85 1.829 m (6') 29.84 kg/m2          Blood Pressure from Last 3 Encounters:   03/26/18 (!) 161/92   03/08/18 (!) 145/92   01/22/18 172/88    Weight from Last 3 Encounters:   03/26/18 99.8 kg (220 lb)   03/08/18 97.7 kg (215 lb 6.2 oz)   01/22/18 101.1 kg (222 lb 14.4 oz)              Today, you had the following     No orders found for display         Today's Medication Changes          These changes are accurate as of 3/26/18  4:36 PM.  If you have any questions, ask your nurse or doctor.               These medicines have changed or have updated prescriptions.        Dose/Directions    bacitracin 500 UNIT/GM Oint   This may have changed:    - when to take this  - reasons to take this   Used for:  Male urinary stress incontinence        Apply topically 2 times daily   Quantity:  30 g   Refills:  1                Primary Care Provider Fax #    Provider Not In System [phone]                Equal Access to Services     [facility]: Hadradha Leavitt, wanathan best, qaeber kimballalyenni avila, elaine ibrahim . So [facility] [place] [facility] [phone].    ATENCIÓN: Si habla español, tiene a alvarez disposición servicios gratuitos de asistencia lingüística. Llame al [phone].    We comply with applicable federal civil rights laws and [place] laws. We do not discriminate on the basis of race, color, national origin, age, disability, sex, sexual orientation, or gender identity.            Thank you!     Thank you for choosing [facility] UROLOGY AND [facility] FOR PROSTATE AND UROLOGIC CANCERS  for your care. Our goal is always to provide you with excellent care. Hearing back from our patients is one way we can continue to improve our services. Please take a few minutes to complete the written survey that you may receive in the mail after your visit with us. Thank you!             Your Updated Medication List  - Protect others around you: Learn how to safely use, store and throw away your medicines at www.disposemymeds.org.          This list is accurate as of 3/26/18  4:36 PM.  Always use your most recent med list.                   Brand Name Dispense Instructions for use Diagnosis    amLODIPine 5 MG tablet    NORVASC     Take 5 mg by mouth every morning        bacitracin 500 UNIT/GM Oint     30 g    Apply topically 2 times daily    Male urinary stress incontinence       bisacodyl 5 MG EC tablet      Take 15 mg by mouth every 3 days        clonazePAM 1 MG tablet    klonoPIN     Take 0.5-1 mg by mouth At Bedtime        Doxycycline Hyclate 100 MG Tbec      Take 100 mg by mouth 2 times daily        levothyroxine 100 MCG tablet    SYNTHROID/LEVOTHROID     Take 100 mcg by mouth every morning        metFORMIN 500 MG tablet    GLUCOPHAGE     Take 500 mg by mouth 2 times daily (with meals)        oxyCODONE IR 5 MG tablet    ROXICODONE    18 tablet    Take 1-2 tablets (5-10 mg) by mouth every 3 hours as needed for other (Moderate to Severe Pain)    Male urinary stress incontinence       PLAVIX PO      Take 75 mg by mouth daily        potassium citrate 15 MEQ (1620 MG) Tbcr      Take 1 tablet by mouth 2 times daily        rosuvastatin 10 MG tablet    CRESTOR     Take 10 mg by mouth every morning        senna-docusate 8.6-50 MG per tablet    SENOKOT-S;PERICOLACE    40 tablet    Take 1 tablet by mouth 2 times daily as needed for constipation    Male urinary stress incontinence       VITAMIN D-3 PO      Take 5,000 Units by mouth daily

## 2018-03-26 NOTE — PROGRESS NOTES
Follow-up after Male Incontinence Surgery    DATE: 3/8/18  SURGEON: Dr. Blane Pérez    HPI: Mr. Uday Montilla is a pleasant 82 year old male who is 2.5 weeks s/p placement of artificial urinary sphincter cuff for male urinary stress incontinence secondary to radical prostatectomy. Of note, he underwent removal of AUS and attempted replacement on 12/21/17 but this was complicated by a urethral injury so only the PRB and scrotal pump were placed at that time. Had a Quintanilla catheter for a little over a month and VCUG on 1/22/18 showed this small urethral defect to be healed.    Pain in the area of the surgery is mild, mainly along perineal incision.   Numbness is none.   Dysuria is none.   Hematuria is absent.  Appetite is normal. Eating and drinking ok.  Bowel movements are at baseline.    His device has not been activated. As such, he remains incontinent into a Depends. Going through at least 3 Depends per day.      PEx  B/P: 161/92, T: Data Unavailable, P: 85, R: Data Unavailable  GEN: well-appearing male in NAD  RESP: no increased respiratory effort  ABD: soft, NT, ND  : Perineal and left inguinal incisions clean, dry, intact with sutures intact. No tenderness or evidence of cellulitis.  No hematoma.  AUS pump palpable in left hemiscrotum that is moderately tender to palpation with mild degree of swelling noted. Pump deactivated (dimple present). Visibly incontinent.     Assessment and Plan:  Excellent outcome after placement of artificial urinary sphincter.  -Attempted device activation today but this was unsuccessful. Likely a week too early as patient had some discomfort during attempted device activation.  -Will have patient RTC in 1 week to re-attempt device activation.    _________________________________________________________________  Becca Calabrese PA-C  Department of Urology

## 2018-03-26 NOTE — LETTER
3/26/2018       RE: Uday Montilla  [address]  [place] [address]     Dear Colleague,    Thank you for referring your patient, Uday Montilla, to the [facility] UROLOGY AND INST FOR PROSTATE AND UROLOGIC CANCERS at [facility] Please see a copy of my visit note below.    Follow-up after Male Incontinence Surgery    DATE: 3/8/18  SURGEON: Dr. Blane Pérez    HPI: Mr. Uday Montilla is a pleasant 82 year old male who is 2.5 weeks s/p placement of artificial urinary sphincter cuff for male urinary stress incontinence secondary to radical prostatectomy. Of note, he underwent removal of AUS and attempted replacement on 12/21/17 but this was complicated by a urethral injury so only the PRB and scrotal pump were placed at that time. Had a Quintanilla catheter for a little over a month and VCUG on 1/22/18 showed this small urethral defect to be healed.    Pain in the area of the surgery is mild, mainly along perineal incision.   Numbness is none.   Dysuria is none.   Hematuria is absent.  Appetite is normal. Eating and drinking ok.  Bowel movements are at baseline.    His device has not been activated. As such, he remains incontinent into a Depends. Going through at least 3 Depends per day.      PEx  B/P: 161/92, T: Data Unavailable, P: 85, R: Data Unavailable  GEN: well-appearing male in NAD  RESP: no increased respiratory effort  ABD: soft, NT, ND  : Perineal and left inguinal incisions clean, dry, intact with sutures intact. No tenderness or evidence of cellulitis.  No hematoma.  AUS pump palpable in left hemiscrotum that is moderately tender to palpation with mild degree of swelling noted. Pump deactivated (dimple present). Visibly incontinent.     Assessment and Plan:  Excellent outcome after placement of artificial urinary sphincter.  -Attempted device activation today but this was unsuccessful. Likely a week too early as patient had some discomfort  during attempted device activation.  -Will have patient RTC in 1 week to re-attempt device activation.    _________________________________________________________________      Again, thank you for allowing me to participate in the care of your patient.      Sincerely,    Becca Calabrese PA-C

## 2018-03-28 ENCOUNTER — PRE VISIT (OUTPATIENT)
Dept: UROLOGY | Facility: CLINIC | Age: 83
End: 2018-03-28

## 2018-03-28 NOTE — TELEPHONE ENCOUNTER
Reason for visit: AUS activation     Relevant information: NA    Records/imaging/labs: All records available    Pt called: No need for a call    Rooming: regular

## 2018-04-02 ENCOUNTER — OFFICE VISIT (OUTPATIENT)
Dept: UROLOGY | Facility: CLINIC | Age: 83
End: 2018-04-02
Payer: COMMERCIAL

## 2018-04-02 VITALS
WEIGHT: 218.7 LBS | SYSTOLIC BLOOD PRESSURE: 170 MMHG | BODY MASS INDEX: 29.62 KG/M2 | HEIGHT: 72 IN | HEART RATE: 56 BPM | DIASTOLIC BLOOD PRESSURE: 92 MMHG

## 2018-04-02 DIAGNOSIS — N39.3 MALE URINARY STRESS INCONTINENCE: Primary | ICD-10-CM

## 2018-04-02 ASSESSMENT — PAIN SCALES - GENERAL: PAINLEVEL: NO PAIN (0)

## 2018-04-02 NOTE — PATIENT INSTRUCTIONS
Please follow up with Dr. Pérez in 3 months to check on your AUS activation update.    It was a pleasure meeting with you today.  Thank you for allowing me and my team the privilege of caring for you today.  YOU are the reason we are here, and I truly hope we provided you with the excellent service you deserve.  Please let us know if there is anything else we can do for you so that we can be sure you are leaving completely satisfied with your care experience.

## 2018-04-02 NOTE — LETTER
4/2/2018       RE: Uday Montilla  [address]  [place] [address]     Dear Colleague,    Thank you for referring your patient, Uday Montilla, to the [facility] UROLOGY AND [facility] FOR PROSTATE AND UROLOGIC CANCERS at [facility]. Please see a copy of my visit note below.    Urology Follow UP    HPI: Uday Montilla is a 82 year old male who is 3 weeks s/p placement of AUS cuff. He initially underwent AUS removal and attempted replacement in December 2017; this was complicated by a urethral injury which led to placement of the PRB and pump only at the time of the initial procedure and delayed cuff placement on 3/8/18. He was seen in follow up on 3/8 at which time he was still having some scrotal swelling, so pump activation was delayed.    Today he returns for follow up. He reports that he is doing well. Pain is minimal. With his device deactivated, he continues to have incontinence into a Depends.     Exam:  BP (!) 170/92  Pulse 56  Ht 1.829 m (6')  Wt 99.2 kg (218 lb 11.2 oz)  BMI 29.66 kg/m2  NAD  No respiratory distress  Incision clean, dry, intact. No tenderness or swelling. No hematoma.  AUS pump palpable in left hemiscrotum with mild ongoing edema. Pump activated without difficulty. Following activation, no leakage of urine with cough or standing.    A/P   Excellent outcome after staged placement of artificial urinary sphincter cuff on 3/8/18. Device activated today.     -Follow up in 3 months     Marek Sauer MD   Urology Resident      =======================================================  As the attending surgeon I, Blane Pérez, interviewed and examined the patient. The plan was developed between me and the patient. My findings and plan are as stated by the resident.    Blane Pérez MD

## 2018-04-02 NOTE — MR AVS SNAPSHOT
After Visit Summary   4/2/2018    Uday Montilla    MRN: [id number]           Patient Information     Date Of Birth          1935        Visit Information        Provider Department      4/2/2018 11:00 AM Blane Pérez MD [facility] and [facility] Urolog[facility]         Follow-ups after your visit        Your next 10 appointments already scheduled     Jul 02, 2018 10:00 AM CDT   (Arrive by 9:45 AM)   Return Visit with Blane Pérez MD   [facility] and [facility]rolog[facility] ([facility])    [address] [place] [id number] [phone]              Who to contact     Please call your clinic at [phone] to:    Ask questions about your health    Make or cancel appointments    Discuss your medicines    Learn about your test results    Speak to your doctor            Additional Information About Your Visit        MyChart Information     [employer] gives you secure access to your electronic health record. If you see a primary care provider, you can also send messages to your care team and make appointments. If you have questions, please call your primary care clinic.  If you do not have a primary care provider, please call [phone] and they will assist you.      [employer] is an electronic gateway that provides easy, online access to your medical records. With [employer], you can request a clinic appointment, read your test results, renew a prescription or communicate with your care team.     To access your existing account, please contact your [facility] or call [phone] for assistance.        Care EveryWhere ID     This is your Care EveryWhere ID. This could be used by other organizations to access your [place] medical records  [id number]        Your Vitals Were     Pulse Height BMI (Body Mass Index)             56 1.829 m (6') 29.66 kg/m2           Blood Pressure from Last 3 Encounters:   04/02/18 (!) 170/92   03/26/18 (!) 161/92   03/08/18 (!) 145/92    Weight from Last 3 Encounters:   04/02/18 99.2 kg (218 lb 11.2 oz)   03/26/18 99.8 kg (220 lb)   03/08/18 97.7 kg (215 lb 6.2 oz)              Today, you had the following     No orders found for display         Today's Medication Changes          These changes are accurate as of 4/2/18 11:52 AM.  If you have any questions, ask your nurse or doctor.               These medicines have changed or have updated prescriptions.        Dose/Directions    bacitracin 500 UNIT/GM Oint   This may have changed:    - when to take this  - reasons to take this   Used for:  Male urinary stress incontinence        Apply topically 2 times daily   Quantity:  30 g   Refills:  1                Primary Care Provider Fax #    Provider Not In System [phone]                Equal Access to Services     GUS SANDY : Olman Leavitt, cordell best, joanne avila, elaine ibrahim . So [facility] [phone].    ATENCIÓN: Si habla español, tiene a alvarez disposición servicios gratuitos de asistencia lingüística. Llame al [phone].    We comply with applicable federal civil rights laws and [place] laws. We do not discriminate on the basis of race, color, national origin, age, disability, sex, sexual orientation, or gender identity.            Thank you!     Thank you for choosing [facility] UROLOGY AND [facility] FOR PROSTATE AND UROLOGIC CANCERS  for your care. Our goal is always to provide you with excellent care. Hearing back from our patients is one way we can continue to improve our services. Please take a few minutes to complete the written survey that you may receive in the mail after your visit with us. Thank you!             Your Updated Medication List - Protect others around you: Learn how to safely use, store and throw away your medicines at www.disposemymeds.org.          This  list is accurate as of 4/2/18 11:52 AM.  Always use your most recent med list.                   Brand Name Dispense Instructions for use Diagnosis    amLODIPine 5 MG tablet    NORVASC     Take 5 mg by mouth every morning        bacitracin 500 UNIT/GM Oint     30 g    Apply topically 2 times daily    Male urinary stress incontinence       bisacodyl 5 MG EC tablet      Take 15 mg by mouth every 3 days        clonazePAM 1 MG tablet    klonoPIN     Take 0.5-1 mg by mouth At Bedtime        Doxycycline Hyclate 100 MG Tbec      Take 100 mg by mouth 2 times daily        levothyroxine 100 MCG tablet    SYNTHROID/LEVOTHROID     Take 100 mcg by mouth every morning        metFORMIN 500 MG tablet    GLUCOPHAGE     Take 500 mg by mouth 2 times daily (with meals)        oxyCODONE IR 5 MG tablet    ROXICODONE    18 tablet    Take 1-2 tablets (5-10 mg) by mouth every 3 hours as needed for other (Moderate to Severe Pain)    Male urinary stress incontinence       PLAVIX PO      Take 75 mg by mouth daily        potassium citrate 15 MEQ (1620 MG) Tbcr      Take 1 tablet by mouth 2 times daily        rosuvastatin 10 MG tablet    CRESTOR     Take 10 mg by mouth every morning        senna-docusate 8.6-50 MG per tablet    SENOKOT-S;PERICOLACE    40 tablet    Take 1 tablet by mouth 2 times daily as needed for constipation    Male urinary stress incontinence       VITAMIN D-3 PO      Take 5,000 Units by mouth daily

## 2018-04-02 NOTE — PROGRESS NOTES
Urology Follow UP    HPI: Uday Montilla is a 82 year old male who is 3 weeks s/p placement of AUS cuff. He initially underwent AUS removal and attempted replacement in December 2017; this was complicated by a urethral injury which led to placement of the PRB and pump only at the time of the initial procedure and delayed cuff placement on 3/8/18. He was seen in follow up on 3/8 at which time he was still having some scrotal swelling, so pump activation was delayed.    Today he returns for follow up. He reports that he is doing well. Pain is minimal. With his device deactivated, he continues to have incontinence into a Depends.     Exam:  BP (!) 170/92  Pulse 56  Ht 1.829 m (6')  Wt 99.2 kg (218 lb 11.2 oz)  BMI 29.66 kg/m2  NAD  No respiratory distress  Incision clean, dry, intact. No tenderness or swelling. No hematoma.  AUS pump palpable in left hemiscrotum with mild ongoing edema. Pump activated without difficulty. Following activation, no leakage of urine with cough or standing.    A/P   Excellent outcome after staged placement of artificial urinary sphincter cuff on 3/8/18. Device activated today.     -Follow up in 3 months     Marek Sauer MD   Urology Resident      =======================================================  As the attending surgeon I, Blane Pérez, interviewed and examined the patient. The plan was developed between me and the patient. My findings and plan are as stated by the resident.    Blane Pérez MD

## 2018-04-02 NOTE — NURSING NOTE
Chief Complaint   Patient presents with     RECHECK     Reason for visit: AUS activation     Deyanira Ratliff

## 2018-06-27 ENCOUNTER — PRE VISIT (OUTPATIENT)
Dept: UROLOGY | Facility: CLINIC | Age: 83
End: 2018-06-27

## 2018-07-02 ENCOUNTER — OFFICE VISIT (OUTPATIENT)
Dept: UROLOGY | Facility: CLINIC | Age: 83
End: 2018-07-02
Payer: COMMERCIAL

## 2018-07-02 VITALS
SYSTOLIC BLOOD PRESSURE: 152 MMHG | WEIGHT: 220 LBS | HEART RATE: 52 BPM | HEIGHT: 72 IN | BODY MASS INDEX: 29.8 KG/M2 | DIASTOLIC BLOOD PRESSURE: 82 MMHG

## 2018-07-02 DIAGNOSIS — N39.3 MALE STRESS INCONTINENCE: Primary | ICD-10-CM

## 2018-07-02 ASSESSMENT — PAIN SCALES - GENERAL: PAINLEVEL: NO PAIN (0)

## 2018-07-02 NOTE — MR AVS SNAPSHOT
After Visit Summary   7/2/2018    Uday Montilla    MRN: [id number]           Patient Information     Date Of Birth          1935        Visit Information        Provider Department      7/2/2018 10:30 AM Mark Munoz MD [facility] and [facility]state [facility] Urologic C[facility]        Today's Diagnoses     Male stress incontinence    -  1       Follow-ups after your visit        Who to contact     Please call your clinic at [phone] to:    Ask questions about your health    Make or cancel appointments    Discuss your medicines    Learn about your test results    Speak to your doctor            Additional Information About Your Visit        MyChart Information     [employer] gives you secure access to your electronic health record. If you see a primary care provider, you can also send messages to your care team and make appointments. If you have questions, please call your primary care clinic.  If you do not have a primary care provider, please call [phone] and they will assist you.      [employer] is an electronic gateway that provides easy, online access to your medical records. With [employer], you can request a clinic appointment, read your test results, renew a prescription or communicate with your care team.     To access your existing account, please contact your [facility] or call [phone] for assistance.        Care EveryWhere ID     This is your Care EveryWhere ID. This could be used by other organizations to access your [place] medical records  [id number]        Your Vitals Were     Pulse Height BMI (Body Mass Index)             52 1.829 m (6') 29.84 kg/m2          Blood Pressure from Last 3 Encounters:   07/02/18 152/82   04/02/18 (!) 170/92   03/26/18 (!) 161/92    Weight from Last 3 Encounters:   07/02/18 99.8 kg (220 lb)   04/02/18 99.2 kg (218 lb 11.2 oz)   03/26/18 99.8 kg (220 lb)              Today, you had the following     No  orders found for display         Today's Medication Changes          These changes are accurate as of 7/2/18  3:01 PM.  If you have any questions, ask your nurse or doctor.               These medicines have changed or have updated prescriptions.        Dose/Directions    bacitracin 500 UNIT/GM Oint   This may have changed:    - when to take this  - reasons to take this   Used for:  Male urinary stress incontinence        Apply topically 2 times daily   Quantity:  30 g   Refills:  1                Primary Care Provider Fax #    Provider Not In System [phone]                Equal Access to Services     GUS SANDY : Hadii lala dela cruz hadasho Soomaali, waaxda luqadaha, qaybta kaalmada adeegyada, waxluis ibrahim . So [facility] [phone].    ATENCIÓN: Si habla español, tiene a alvarez disposición servicios gratuitos de asistencia lingüística. Llame al [phone].    We comply with applicable federal civil rights laws and [place] laws. We do not discriminate on the basis of race, color, national origin, age, disability, sex, sexual orientation, or gender identity.            Thank you!     Thank you for choosing [facility] UROLOGY AND [facility] FOR PROSTATE AND UROLOGIC CANCERS  for your care. Our goal is always to provide you with excellent care. Hearing back from our patients is one way we can continue to improve our services. Please take a few minutes to complete the written survey that you may receive in the mail after your visit with us. Thank you!             Your Updated Medication List - Protect others around you: Learn how to safely use, store and throw away your medicines at www.disposemymeds.org.          This list is accurate as of 7/2/18  3:01 PM.  Always use your most recent med list.                   Brand Name Dispense Instructions for use Diagnosis    amLODIPine 5 MG tablet    NORVASC     Take 5 mg by mouth every morning        bacitracin 500 UNIT/GM Oint     30 g    Apply topically 2  times daily    Male urinary stress incontinence       bisacodyl 5 MG EC tablet      Take 15 mg by mouth every 3 days        clonazePAM 1 MG tablet    klonoPIN     Take 0.5-1 mg by mouth At Bedtime        Doxycycline Hyclate 100 MG Tbec      Take 100 mg by mouth 2 times daily        levothyroxine 100 MCG tablet    SYNTHROID/LEVOTHROID     Take 100 mcg by mouth every morning        metFORMIN 500 MG tablet    GLUCOPHAGE     Take 500 mg by mouth 2 times daily (with meals)        oxyCODONE IR 5 MG tablet    ROXICODONE    18 tablet    Take 1-2 tablets (5-10 mg) by mouth every 3 hours as needed for other (Moderate to Severe Pain)    Male urinary stress incontinence       PLAVIX PO      Take 75 mg by mouth daily        potassium citrate 15 MEQ (1620 MG) Tbcr      Take 1 tablet by mouth 2 times daily        rosuvastatin 10 MG tablet    CRESTOR     Take 10 mg by mouth every morning        senna-docusate 8.6-50 MG per tablet    SENOKOT-S;PERICOLACE    40 tablet    Take 1 tablet by mouth 2 times daily as needed for constipation    Male urinary stress incontinence       VITAMIN D-3 PO      Take 5,000 Units by mouth daily

## 2018-07-02 NOTE — PROGRESS NOTES
Urology Followup Note    Uday Montilla is a 82 year old male with history of radical prostatectomy who is 4 months s/p AUS cuff insertion. This was his second AUS. First AUS had sufcuff atrophy. This AUS was actually done at two stages (first with pump, reservoir then later cuff - urethral injury at time of cuff dissection). He wears a pad daily only for security and it is rarely wet at all. He cycles it easily. The pump is easily palpable. He is happy with his current state of things.     /[birth year]  Pulse 52  Ht 1.829 m (6')  Wt 99.8 kg (220 lb)  BMI 29.84 kg/m2        Exam:  Incision clean, dry, intact  No tenderness or evidence of cellulitis  No hematoma    A/P   Excellent outcome after AUS revision    F/U:   No scheduled followup needed. He will let us know if new issues arise.  He is well aware about the need to avoid catheterization and requirement for urology to be involved if a catheter is absolutely necessary.    Mark Munoz MD  Reconstructive Urology    I spent 15 minutes on the care of Uday Montilla. Over 50% of my time was spent counseling or coordinating the care of this patient.

## 2018-07-02 NOTE — LETTER
7/2/2018       RE: Uday Montilla  [address]  [place] [address]     Dear Colleague,    Thank you for referring your patient, Uday Montilla, to the [facility] UROLOGY AND [facility] FOR PROSTATE AND UROLOGIC CANCERS at [facility]. Please see a copy of my visit note below.    Urology Followup Note    Uday Montlila is a 82 year old male with history of radical prostatectomy who is 4 months s/p AUS cuff insertion. This was his second AUS. First AUS had sufcuff atrophy. This AUS was actually done at two stages (first with pump, reservoir then later cuff - urethral injury at time of cuff dissection). He wears a pad daily only for security and it is rarely wet at all. He cycles it easily. The pump is easily palpable. He is happy with his current state of things.     /82  Pulse 52  Ht 1.829 m (6')  Wt 99.8 kg (220 lb)  BMI 29.84 kg/m2        Exam:  Incision clean, dry, intact  No tenderness or evidence of cellulitis  No hematoma    A/P   Excellent outcome after AUS revision    F/U:   No scheduled followup needed. He will let us know if new issues arise.  He is well aware about the need to avoid catheterization and requirement for urology to be involved if a catheter is absolutely necessary.    Mark Munoz MD  Reconstructive Urology    I spent 15 minutes on the care of Uday Montilla. Over 50% of my time was spent counseling or coordinating the care of this patient.

## 2018-07-18 LAB
ALT SERPL-CCNC: 14 IU/L (ref 8–45)
CHOLEST SERPL-MCNC: 144 MG/DL (ref 100–199)
CREAT SERPL-MCNC: 1.29 MG/DL (ref 0.72–1.25)
GFR SERPL CREATININE-BSD FRML MDRD: 53 ML/MIN/1.73M2
GLUCOSE SERPL-MCNC: 115 MG/DL (ref 65–100)
HDLC SERPL-MCNC: 56 MG/DL
LDLC SERPL CALC-MCNC: 75 MG/DL
NONHDLC SERPL-MCNC: 88 MG/DL
POTASSIUM SERPL-SCNC: 4.6 MMOL/L (ref 3.5–5)
TRIGL SERPL-MCNC: 63 MG/DL
TSH SERPL-ACNC: 0.1 UIU/ML (ref 0.35–4.94)

## 2019-06-17 ENCOUNTER — TRANSFERRED RECORDS (OUTPATIENT)
Dept: HEALTH INFORMATION MANAGEMENT | Facility: CLINIC | Age: 84
End: 2019-06-17

## 2019-06-17 LAB — EJECTION FRACTION: 28

## 2019-07-05 LAB
ALT SERPL-CCNC: 28 IU/L (ref 8–45)
AST SERPL-CCNC: 27 IU/L (ref 2–40)
CREAT SERPL-MCNC: 1.57 MG/DL (ref 0.72–1.25)
GFR SERPL CREATININE-BSD FRML MDRD: 42 ML/MIN/1.73M2
GLUCOSE SERPL-MCNC: 101 MG/DL (ref 65–100)
POTASSIUM SERPL-SCNC: 4.6 MMOL/L (ref 3.5–5)
TSH SERPL-ACNC: 2.12 UIU/ML (ref 0.35–4.94)

## 2019-07-11 LAB — EJECTION FRACTION: 21

## 2019-07-13 ENCOUNTER — TRANSFERRED RECORDS (OUTPATIENT)
Dept: HEALTH INFORMATION MANAGEMENT | Facility: CLINIC | Age: 84
End: 2019-07-13

## 2019-07-19 ENCOUNTER — TRANSFERRED RECORDS (OUTPATIENT)
Dept: HEALTH INFORMATION MANAGEMENT | Facility: CLINIC | Age: 84
End: 2019-07-19

## 2019-07-19 LAB
CREAT SERPL-MCNC: 1.65 MG/DL (ref 0.72–1.25)
GFR SERPL CREATININE-BSD FRML MDRD: 40 ML/MIN/1.73M2
GLUCOSE SERPL-MCNC: 94 MG/DL (ref 65–100)
POTASSIUM SERPL-SCNC: 5.2 MMOL/L (ref 3.5–5)

## 2019-08-27 ENCOUNTER — HOSPITAL ENCOUNTER (OUTPATIENT)
Dept: CARDIAC REHAB | Facility: CLINIC | Age: 84
End: 2019-08-27
Attending: INTERNAL MEDICINE
Payer: MEDICARE

## 2019-08-27 PROCEDURE — 93798 PHYS/QHP OP CAR RHAB W/ECG: CPT

## 2019-08-27 PROCEDURE — 93797 PHYS/QHP OP CAR RHAB WO ECG: CPT | Mod: 59

## 2019-08-27 PROCEDURE — 40000116 ZZH STATISTIC OP CR VISIT

## 2019-08-27 PROCEDURE — 40000575 ZZH STATISTIC OP CARDIAC VISIT #2

## 2019-08-30 ENCOUNTER — HOSPITAL ENCOUNTER (OUTPATIENT)
Dept: CARDIAC REHAB | Facility: CLINIC | Age: 84
End: 2019-08-30
Attending: INTERNAL MEDICINE
Payer: MEDICARE

## 2019-08-30 PROCEDURE — 40000116 ZZH STATISTIC OP CR VISIT: Performed by: REHABILITATION PRACTITIONER

## 2019-08-30 PROCEDURE — 93798 PHYS/QHP OP CAR RHAB W/ECG: CPT | Performed by: REHABILITATION PRACTITIONER

## 2019-09-04 ENCOUNTER — HOSPITAL ENCOUNTER (OUTPATIENT)
Dept: CARDIAC REHAB | Facility: CLINIC | Age: 84
End: 2019-09-04
Attending: INTERNAL MEDICINE
Payer: MEDICARE

## 2019-09-04 PROCEDURE — 40000116 ZZH STATISTIC OP CR VISIT

## 2019-09-04 PROCEDURE — 93798 PHYS/QHP OP CAR RHAB W/ECG: CPT

## 2019-09-11 ENCOUNTER — HOSPITAL ENCOUNTER (OUTPATIENT)
Dept: CARDIAC REHAB | Facility: CLINIC | Age: 84
End: 2019-09-11
Attending: INTERNAL MEDICINE
Payer: MEDICARE

## 2019-09-11 PROCEDURE — 93798 PHYS/QHP OP CAR RHAB W/ECG: CPT | Performed by: REHABILITATION PRACTITIONER

## 2019-09-11 PROCEDURE — 40000116 ZZH STATISTIC OP CR VISIT: Performed by: REHABILITATION PRACTITIONER

## 2019-09-13 ENCOUNTER — HOSPITAL ENCOUNTER (OUTPATIENT)
Dept: CARDIAC REHAB | Facility: CLINIC | Age: 84
End: 2019-09-13
Attending: INTERNAL MEDICINE
Payer: MEDICARE

## 2019-09-13 PROCEDURE — 40000116 ZZH STATISTIC OP CR VISIT: Performed by: REHABILITATION PRACTITIONER

## 2019-09-13 PROCEDURE — 93798 PHYS/QHP OP CAR RHAB W/ECG: CPT | Performed by: REHABILITATION PRACTITIONER

## 2019-09-16 ENCOUNTER — HOSPITAL ENCOUNTER (OUTPATIENT)
Dept: CARDIAC REHAB | Facility: CLINIC | Age: 84
End: 2019-09-16
Attending: INTERNAL MEDICINE
Payer: MEDICARE

## 2019-09-16 PROCEDURE — 93798 PHYS/QHP OP CAR RHAB W/ECG: CPT

## 2019-09-16 PROCEDURE — 40000116 ZZH STATISTIC OP CR VISIT

## 2019-09-18 ENCOUNTER — HOSPITAL ENCOUNTER (OUTPATIENT)
Dept: CARDIAC REHAB | Facility: CLINIC | Age: 84
End: 2019-09-18
Attending: INTERNAL MEDICINE
Payer: MEDICARE

## 2019-09-18 PROCEDURE — 93798 PHYS/QHP OP CAR RHAB W/ECG: CPT

## 2019-09-18 PROCEDURE — 93797 PHYS/QHP OP CAR RHAB WO ECG: CPT | Mod: 59

## 2019-09-18 PROCEDURE — 40000116 ZZH STATISTIC OP CR VISIT

## 2019-09-18 PROCEDURE — 40000575 ZZH STATISTIC OP CARDIAC VISIT #2

## 2019-09-20 ENCOUNTER — HOSPITAL ENCOUNTER (OUTPATIENT)
Dept: CARDIAC REHAB | Facility: CLINIC | Age: 84
End: 2019-09-20
Attending: INTERNAL MEDICINE
Payer: MEDICARE

## 2019-09-20 PROCEDURE — 40000116 ZZH STATISTIC OP CR VISIT

## 2019-09-20 PROCEDURE — 93798 PHYS/QHP OP CAR RHAB W/ECG: CPT

## 2019-10-04 ENCOUNTER — HEALTH MAINTENANCE LETTER (OUTPATIENT)
Age: 84
End: 2019-10-04

## 2019-10-14 NOTE — PROGRESS NOTES
Cardiac Rehab Discharge Summary    Reason for discharge:    Change in medical status.    Progress towards goals:  Goals partially met.  Barriers to achieving goals:   Leg weakness, balance issues..    Recommendation(s):    Continue home exercise program.    Physician cosignature/electronic signature indicates agreements with the ITP document and approval of discharge.

## 2020-02-09 ENCOUNTER — HEALTH MAINTENANCE LETTER (OUTPATIENT)
Age: 85
End: 2020-02-09

## 2020-11-08 ENCOUNTER — HEALTH MAINTENANCE LETTER (OUTPATIENT)
Age: 85
End: 2020-11-08

## 2021-02-17 ENCOUNTER — IMMUNIZATION (OUTPATIENT)
Dept: NURSING | Facility: CLINIC | Age: 86
End: 2021-02-17
Payer: MEDICARE

## 2021-02-17 PROCEDURE — 0001A PR COVID VAC PFIZER DIL RECON 30 MCG/0.3 ML IM: CPT

## 2021-02-17 PROCEDURE — 91300 PR COVID VAC PFIZER DIL RECON 30 MCG/0.3 ML IM: CPT

## 2021-03-10 ENCOUNTER — IMMUNIZATION (OUTPATIENT)
Dept: NURSING | Facility: CLINIC | Age: 86
End: 2021-03-10
Attending: INTERNAL MEDICINE
Payer: MEDICARE

## 2021-03-10 PROCEDURE — 91300 PR COVID VAC PFIZER DIL RECON 30 MCG/0.3 ML IM: CPT

## 2021-03-10 PROCEDURE — 0002A PR COVID VAC PFIZER DIL RECON 30 MCG/0.3 ML IM: CPT

## 2021-03-28 ENCOUNTER — HEALTH MAINTENANCE LETTER (OUTPATIENT)
Age: 86
End: 2021-03-28

## 2021-09-12 ENCOUNTER — HEALTH MAINTENANCE LETTER (OUTPATIENT)
Age: 86
End: 2021-09-12

## 2022-04-23 ENCOUNTER — HEALTH MAINTENANCE LETTER (OUTPATIENT)
Age: 87
End: 2022-04-23

## 2022-10-30 ENCOUNTER — HEALTH MAINTENANCE LETTER (OUTPATIENT)
Age: 87
End: 2022-10-30

## 2023-02-15 ENCOUNTER — APPOINTMENT (OUTPATIENT)
Dept: URBAN - METROPOLITAN AREA CLINIC 256 | Age: 88
Setting detail: DERMATOLOGY
End: 2023-02-15

## 2023-02-15 VITALS — WEIGHT: 218 LBS | HEIGHT: 72 IN

## 2023-02-15 DIAGNOSIS — L90.5 SCAR CONDITIONS AND FIBROSIS OF SKIN: ICD-10-CM

## 2023-02-15 DIAGNOSIS — L57.8 OTHER SKIN CHANGES DUE TO CHRONIC EXPOSURE TO NONIONIZING RADIATION: ICD-10-CM

## 2023-02-15 DIAGNOSIS — L71.1 RHINOPHYMA: ICD-10-CM

## 2023-02-15 DIAGNOSIS — L82.0 INFLAMED SEBORRHEIC KERATOSIS: ICD-10-CM

## 2023-02-15 PROCEDURE — OTHER MIPS QUALITY: OTHER

## 2023-02-15 PROCEDURE — 17110 DESTRUCT B9 LESION 1-14: CPT

## 2023-02-15 PROCEDURE — 99213 OFFICE O/P EST LOW 20 MIN: CPT | Mod: 25

## 2023-02-15 PROCEDURE — OTHER COUNSELING: OTHER

## 2023-02-15 PROCEDURE — OTHER LIQUID NITROGEN: OTHER

## 2023-02-15 ASSESSMENT — LOCATION DETAILED DESCRIPTION DERM
LOCATION DETAILED: RIGHT NASAL ALA
LOCATION DETAILED: LEFT SUPERIOR FOREHEAD
LOCATION DETAILED: LEFT CENTRAL MALAR CHEEK

## 2023-02-15 ASSESSMENT — LOCATION SIMPLE DESCRIPTION DERM
LOCATION SIMPLE: LEFT CHEEK
LOCATION SIMPLE: RIGHT NOSE
LOCATION SIMPLE: LEFT FOREHEAD

## 2023-02-15 ASSESSMENT — LOCATION ZONE DERM
LOCATION ZONE: NOSE
LOCATION ZONE: FACE

## 2023-02-15 NOTE — PROCEDURE: LIQUID NITROGEN
Add 52 Modifier (Optional): no
Medical Necessity Information: It is in your best interest to select a reason for this procedure from the list below. All of these items fulfill various CMS LCD requirements except the new and changing color options.
Detail Level: Detailed
Number Of Freeze-Thaw Cycles: 1 freeze-thaw cycle
Show Applicator Variable?: Yes
Post-Care Instructions: I reviewed with the patient in detail post-care instructions. Patient is to wear sunprotection, and avoid picking at any of the treated lesions. Pt may apply Vaseline to crusted or scabbing areas.
Consent: The patient's consent was obtained including but not limited to risks of crusting, scabbing, blistering, scarring, darker or lighter pigmentary change, recurrence, incomplete removal and infection.
Medical Necessity Clause: This procedure was medically necessary because the lesions that were treated were:
Duration Of Freeze Thaw-Cycle (Seconds): 5-10
Spray Paint Text: The liquid nitrogen was applied to the skin utilizing a spray paint frosting technique.

## 2023-02-15 NOTE — PROCEDURE: COUNSELING
Patient Specific Counseling (Will Not Stick From Patient To Patient): Will continue to monitor area as this could be a recurrent BCC.
Detail Level: Detailed
Detail Level: Zone
Patient Specific Counseling (Will Not Stick From Patient To Patient): Advised patient to continue monitoring area for bleeding, will not treat today since patient has only noticed bleeding for the past week. If it continues to bleed after 3-4 weeks then he should return to clinic for evaluation.

## 2023-02-15 NOTE — PROCEDURE: MIPS QUALITY
Quality 110: Preventive Care And Screening: Influenza Immunization: Influenza immunization was not ordered or administered, reason not given
Quality 431: Preventive Care And Screening: Unhealthy Alcohol Use - Screening: Patient not identified as an unhealthy alcohol user when screened for unhealthy alcohol use using a systematic screening method
Quality 226: Preventive Care And Screening: Tobacco Use: Screening And Cessation Intervention: Patient screened for tobacco use and is an ex/non-smoker
Quality 130: Documentation Of Current Medications In The Medical Record: Current Medications Documented
Quality 111:Pneumonia Vaccination Status For Older Adults: Pneumococcal vaccine (PPSV23) administered on or after patient’s 60th birthday and before the end of the measurement period
Detail Level: Detailed

## 2023-06-01 ENCOUNTER — HEALTH MAINTENANCE LETTER (OUTPATIENT)
Age: 88
End: 2023-06-01

## 2023-11-15 ENCOUNTER — APPOINTMENT (OUTPATIENT)
Dept: URBAN - METROPOLITAN AREA CLINIC 256 | Age: 88
Setting detail: DERMATOLOGY
End: 2023-11-15

## 2023-11-15 VITALS — WEIGHT: 207 LBS | HEIGHT: 72 IN

## 2023-11-15 DIAGNOSIS — D485 NEOPLASM OF UNCERTAIN BEHAVIOR OF SKIN: ICD-10-CM

## 2023-11-15 DIAGNOSIS — L57.0 ACTINIC KERATOSIS: ICD-10-CM

## 2023-11-15 DIAGNOSIS — L71.1 RHINOPHYMA: ICD-10-CM

## 2023-11-15 DIAGNOSIS — L0391 CELLULITIS AND ABSCESS OF UNSPECIFIED SITES: ICD-10-CM

## 2023-11-15 DIAGNOSIS — L0390 CELLULITIS AND ABSCESS OF UNSPECIFIED SITES: ICD-10-CM

## 2023-11-15 DIAGNOSIS — L57.8 OTHER SKIN CHANGES DUE TO CHRONIC EXPOSURE TO NONIONIZING RADIATION: ICD-10-CM

## 2023-11-15 PROBLEM — J34.0 ABSCESS, FURUNCLE AND CARBUNCLE OF NOSE: Status: ACTIVE | Noted: 2023-11-15

## 2023-11-15 PROBLEM — D48.5 NEOPLASM OF UNCERTAIN BEHAVIOR OF SKIN: Status: ACTIVE | Noted: 2023-11-15

## 2023-11-15 PROCEDURE — 11102 TANGNTL BX SKIN SINGLE LES: CPT

## 2023-11-15 PROCEDURE — OTHER LIQUID NITROGEN: OTHER

## 2023-11-15 PROCEDURE — OTHER MIPS QUALITY: OTHER

## 2023-11-15 PROCEDURE — OTHER BIOPSY BY SHAVE METHOD: OTHER

## 2023-11-15 PROCEDURE — OTHER PHOTO-DOCUMENTATION: OTHER

## 2023-11-15 PROCEDURE — OTHER COUNSELING: OTHER

## 2023-11-15 PROCEDURE — OTHER INTRALESIONAL KENALOG: OTHER

## 2023-11-15 PROCEDURE — 11103 TANGNTL BX SKIN EA SEP/ADDL: CPT

## 2023-11-15 PROCEDURE — 99213 OFFICE O/P EST LOW 20 MIN: CPT | Mod: 25

## 2023-11-15 PROCEDURE — OTHER ADDITIONAL NOTES: OTHER

## 2023-11-15 PROCEDURE — 17000 DESTRUCT PREMALG LESION: CPT | Mod: 59

## 2023-11-15 PROCEDURE — 11900 INJECT SKIN LESIONS </W 7: CPT | Mod: 59

## 2023-11-15 ASSESSMENT — LOCATION ZONE DERM
LOCATION ZONE: NOSE
LOCATION ZONE: FACE

## 2023-11-15 ASSESSMENT — LOCATION SIMPLE DESCRIPTION DERM
LOCATION SIMPLE: RIGHT FOREHEAD
LOCATION SIMPLE: RIGHT NOSE
LOCATION SIMPLE: LEFT CHEEK
LOCATION SIMPLE: NOSE
LOCATION SIMPLE: LEFT FOREHEAD

## 2023-11-15 ASSESSMENT — LOCATION DETAILED DESCRIPTION DERM
LOCATION DETAILED: NASAL DORSUM
LOCATION DETAILED: LEFT SUPERIOR FOREHEAD
LOCATION DETAILED: NASAL TIP
LOCATION DETAILED: LEFT SUPERIOR PREAURICULAR CHEEK
LOCATION DETAILED: RIGHT NASAL ALA
LOCATION DETAILED: RIGHT SUPERIOR FOREHEAD

## 2023-11-15 NOTE — HPI: BUMPS
Is This A New Presentation, Or A Follow-Up?: Bumps
Additional History: Patient takes doxycycline 100mg BID from his primary but states it is not helping. He notes 2 larger bumps since last visit that are rapidly growing.

## 2023-11-15 NOTE — PROCEDURE: ADDITIONAL NOTES
Detail Level: Simple
Additional Notes: Recommend to continue with doxycycline as prescribed by PCP.
Render Risk Assessment In Note?: no

## 2023-11-15 NOTE — HPI: SKIN LESION
Is This A New Presentation, Or A Follow-Up?: Skin Lesion
Additional History: Patient is unsure how long this lesion has been present for.

## 2023-11-15 NOTE — PROCEDURE: LIQUID NITROGEN
Detail Level: Detailed
Consent: The patient's consent was obtained including but not limited to risks of crusting, scabbing, blistering, scarring, darker or lighter pigmentary change, recurrence, incomplete removal and infection.
Show Aperture Variable?: Yes
Duration Of Freeze Thaw-Cycle (Seconds): 5
Number Of Freeze-Thaw Cycles: 1 freeze-thaw cycle
Render Post-Care Instructions In Note?: no
Post-Care Instructions: I reviewed with the patient in detail post-care instructions. Patient is to wear sunprotection, and avoid picking at any of the treated lesions. Pt may apply Vaseline to crusted or scabbing areas.

## 2023-11-21 ENCOUNTER — APPOINTMENT (OUTPATIENT)
Dept: URBAN - METROPOLITAN AREA CLINIC 256 | Age: 88
Setting detail: DERMATOLOGY
End: 2023-11-21

## 2023-11-21 DIAGNOSIS — L0391 CELLULITIS AND ABSCESS OF UNSPECIFIED SITES: ICD-10-CM

## 2023-11-21 DIAGNOSIS — D485 NEOPLASM OF UNCERTAIN BEHAVIOR OF SKIN: ICD-10-CM

## 2023-11-21 DIAGNOSIS — L0390 CELLULITIS AND ABSCESS OF UNSPECIFIED SITES: ICD-10-CM

## 2023-11-21 PROBLEM — J34.0 ABSCESS, FURUNCLE AND CARBUNCLE OF NOSE: Status: ACTIVE | Noted: 2023-11-21

## 2023-11-21 PROBLEM — D48.5 NEOPLASM OF UNCERTAIN BEHAVIOR OF SKIN: Status: ACTIVE | Noted: 2023-11-21

## 2023-11-21 PROBLEM — C44.319 BASAL CELL CARCINOMA OF SKIN OF OTHER PARTS OF FACE: Status: ACTIVE | Noted: 2023-11-21

## 2023-11-21 PROCEDURE — 17282 DSTR MAL LS F/E/E/N/L/M1.1-2: CPT | Mod: 79

## 2023-11-21 PROCEDURE — OTHER INCISION AND DRAINAGE: OTHER

## 2023-11-21 PROCEDURE — OTHER EDUCATIONAL RESOURCES PROVIDED: OTHER

## 2023-11-21 PROCEDURE — OTHER ADDITIONAL NOTES: OTHER

## 2023-11-21 PROCEDURE — 11102 TANGNTL BX SKIN SINGLE LES: CPT | Mod: 59,79

## 2023-11-21 PROCEDURE — OTHER BIOPSY BY SHAVE METHOD: OTHER

## 2023-11-21 PROCEDURE — 10060 I&D ABSCESS SIMPLE/SINGLE: CPT | Mod: 79

## 2023-11-21 PROCEDURE — 17281 DSTR MAL LS F/E/E/N/L/M .6-1: CPT | Mod: 79

## 2023-11-21 PROCEDURE — OTHER COUNSELING: OTHER

## 2023-11-21 PROCEDURE — OTHER CURETTAGE AND DESTRUCTION: OTHER

## 2023-11-21 PROCEDURE — OTHER MIPS QUALITY: OTHER

## 2023-11-21 ASSESSMENT — LOCATION SIMPLE DESCRIPTION DERM
LOCATION SIMPLE: NOSE
LOCATION SIMPLE: RIGHT NOSE
LOCATION SIMPLE: LEFT FOREHEAD

## 2023-11-21 ASSESSMENT — LOCATION ZONE DERM
LOCATION ZONE: FACE
LOCATION ZONE: NOSE

## 2023-11-21 ASSESSMENT — LOCATION DETAILED DESCRIPTION DERM
LOCATION DETAILED: NASAL DORSUM
LOCATION DETAILED: RIGHT NASAL ALA
LOCATION DETAILED: LEFT SUPERIOR LATERAL FOREHEAD

## 2023-11-21 NOTE — PROCEDURE: INCISION AND DRAINAGE
Detail Level: Detailed
Lesion Type: Abscess
I&D Type: complicated
Method: 11 blade
Curette: No
Anesthesia Type: 1% lidocaine with epinephrine
Anesthesia Volume In Cc: 0.5
Size Of Lesion In Cm (Optional But May Be Required For Some Insurances): 0
Wound Care: Petrolatum
Dressing: dry sterile dressing
Curette Text (Optional): After the contents were expressed a curette was used to partially remove the cyst wall.
Epidermal Sutures: 4-0 Ethilon
Epidermal Closure: simple interrupted
Suture Text: The incision was partially closed with
Consent was obtained and risks were reviewed including but not limited to delayed wound healing, infection, need for multiple I and D's, and pain.
Post-Care Instructions: I reviewed with the patient in detail post-care instructions. Patient should keep wound covered and call the office should any redness, pain, swelling or worsening occur.
Drainage Amount?: minimal

## 2023-11-21 NOTE — PROCEDURE: ADDITIONAL NOTES
Detail Level: Simple
Render Risk Assessment In Note?: no
Additional Notes: - Attempted I&D on the right nasal ala and nasal dorsum. Able to exude contents from nasal dorsum, but no contents exuded from right nasal ala.

## 2023-11-21 NOTE — PROCEDURE: CURETTAGE AND DESTRUCTION
Body Location Override (Optional - Billing Will Still Be Based On Selected Body Map Location If Applicable): right superior forehead
Detail Level: Simple
Number Of Curettages: 2
Size Of Lesion After Curettage: 0.8
Add Intralesional Injection: No
Concentration (Mg/Ml Or Millions Of Plaque Forming Units/Cc): 0.01
Total Volume (Ccs): 1
Anesthesia Type: 1% lidocaine with epinephrine
Cautery Type: electrocautery (heat cautery)
What Was Performed First?: Curettage
Final Size Statement: The size of the lesion after curettage was
Additional Information: (Optional): The wound was cleaned, and a pressure dressing was applied.  The patient received detailed post-op instructions.
Consent was obtained from the patient. The risks, benefits and alternatives to therapy were discussed in detail. Specifically, the risks of infection, scarring, bleeding, prolonged wound healing, nerve injury, incomplete removal, allergy to anesthesia and recurrence were addressed. Alternatives to ED&C, such as: surgical removal and XRT were also discussed.  Prior to the procedure, the treatment site was clearly identified and confirmed by the patient. All components of Universal Protocol/PAUSE Rule completed.
Post-Care Instructions: I reviewed with the patient in detail post-care instructions. Patient is to keep the area dry for 48 hours, and not to engage in any swimming until the area is healed. Should the patient develop any fevers, chills, bleeding, severe pain patient will contact the office immediately.
Bill As A Line Item Or As Units: Line Item
Detail Level: Detailed
Size Of Lesion After Curettage: 1.2

## 2024-02-13 ENCOUNTER — APPOINTMENT (OUTPATIENT)
Dept: URBAN - METROPOLITAN AREA CLINIC 256 | Age: 89
Setting detail: DERMATOLOGY
End: 2024-02-14

## 2024-02-13 DIAGNOSIS — D49.2 NEOPLASM OF UNSPECIFIED BEHAVIOR OF BONE, SOFT TISSUE, AND SKIN: ICD-10-CM

## 2024-02-13 DIAGNOSIS — L71.1 RHINOPHYMA: ICD-10-CM

## 2024-02-13 PROCEDURE — 11102 TANGNTL BX SKIN SINGLE LES: CPT

## 2024-02-13 PROCEDURE — OTHER COUNSELING: OTHER

## 2024-02-13 PROCEDURE — OTHER BIOPSY BY SHAVE METHOD: OTHER

## 2024-02-13 PROCEDURE — OTHER PHOTO-DOCUMENTATION: OTHER

## 2024-02-13 PROCEDURE — OTHER DEFER: OTHER

## 2024-02-13 PROCEDURE — OTHER MIPS QUALITY: OTHER

## 2024-02-13 PROCEDURE — 99212 OFFICE O/P EST SF 10 MIN: CPT | Mod: 25

## 2024-02-13 PROCEDURE — OTHER EDUCATIONAL RESOURCES PROVIDED: OTHER

## 2024-02-13 ASSESSMENT — LOCATION ZONE DERM: LOCATION ZONE: NOSE

## 2024-02-13 ASSESSMENT — LOCATION DETAILED DESCRIPTION DERM: LOCATION DETAILED: RIGHT NASAL ALA

## 2024-02-13 ASSESSMENT — LOCATION SIMPLE DESCRIPTION DERM: LOCATION SIMPLE: RIGHT NOSE

## 2024-02-13 NOTE — HPI: SKIN LESION
Is This A New Presentation, Or A Follow-Up?: Skin Lesion
Additional History: \\nDidn’t drain much \\nNo drainage \\nBleeding occasionally \\nRosacea vs cancer \\n\\nSend to Dr. 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

## 2024-02-13 NOTE — PROCEDURE: BIOPSY BY SHAVE METHOD
Detail Level: Detailed
Depth Of Biopsy: dermis
Was A Bandage Applied: Yes
Size Of Lesion In Cm: 1.2
X Size Of Lesion In Cm: 0
Biopsy Type: H and E
Biopsy Method: Dermablade
Anesthesia Type: 1% lidocaine with epinephrine
Anesthesia Volume In Cc: 0.5
Hemostasis: Drysol
Wound Care: Petrolatum
Dressing: bandage
Destruction After The Procedure: No
Type Of Destruction Used: Curettage
Curettage Text: The wound bed was treated with curettage after the biopsy was performed.
Cryotherapy Text: The wound bed was treated with cryotherapy after the biopsy was performed.
Electrodesiccation Text: The wound bed was treated with electrodesiccation after the biopsy was performed.
Electrodesiccation And Curettage Text: The wound bed was treated with electrodesiccation and curettage after the biopsy was performed.
Silver Nitrate Text: The wound bed was treated with silver nitrate after the biopsy was performed.
Lab: [id number]
Path Notes (To The Dermatopathologist): Please check margins
Consent: Written consent was obtained and risks were reviewed including but not limited to scarring, infection, bleeding, scabbing, incomplete removal, nerve damage and allergy to anesthesia.
Post-Care Instructions: I reviewed with the patient in detail post-care instructions. Patient is to keep the biopsy site dry overnight, and then apply bacitracin twice daily until healed. Patient may apply hydrogen peroxide soaks to remove any crusting.
Notification Instructions: Patient will be notified of biopsy results. However, patient instructed to call the office if not contacted within 2 weeks.
Billing Type: Third-Party Bill
Information: Selecting Yes will display possible errors in your note based on the variables you have selected. This validation is only offered as a suggestion for you. PLEASE NOTE THAT THE VALIDATION TEXT WILL BE REMOVED WHEN YOU FINALIZE YOUR NOTE. IF YOU WANT TO FAX A PRELIMINARY NOTE YOU WILL NEED TO TOGGLE THIS TO 'NO' IF YOU DO NOT WANT IT IN YOUR FAXED NOTE.

## 2024-02-13 NOTE — PROCEDURE: COUNSELING
Detail Level: Detailed
Patient Specific Counseling (Will Not Stick From Patient To Patient): He will see Dr Pardo for possible cosmetic C02 laser resurfacing or other.

## 2024-02-13 NOTE — PROCEDURE: DEFER
Reason To Defer Override: deferring to Robyn Pardo MD for consultation
Introduction Text (Please End With A Colon): The following procedure was deferred:
Size Of Lesion In Cm (Optional): 0
Detail Level: Detailed

## 2024-03-14 ENCOUNTER — APPOINTMENT (OUTPATIENT)
Dept: URBAN - METROPOLITAN AREA CLINIC 255 | Age: 89
Setting detail: DERMATOLOGY
End: 2024-03-17

## 2024-03-14 DIAGNOSIS — L71.1 RHINOPHYMA: ICD-10-CM

## 2024-03-14 PROBLEM — D04.39 CARCINOMA IN SITU OF SKIN OF OTHER PARTS OF FACE: Status: ACTIVE | Noted: 2024-03-14

## 2024-03-14 PROCEDURE — OTHER MOHS SURGERY: OTHER

## 2024-03-14 PROCEDURE — OTHER MIPS QUALITY: OTHER

## 2024-03-14 PROCEDURE — 99213 OFFICE O/P EST LOW 20 MIN: CPT | Mod: 25

## 2024-03-14 PROCEDURE — 17311 MOHS 1 STAGE H/N/HF/G: CPT

## 2024-03-14 PROCEDURE — OTHER COUNSELING: OTHER

## 2024-03-14 ASSESSMENT — LOCATION SIMPLE DESCRIPTION DERM: LOCATION SIMPLE: NOSE

## 2024-03-14 ASSESSMENT — LOCATION DETAILED DESCRIPTION DERM: LOCATION DETAILED: NASAL SUPRATIP

## 2024-03-14 ASSESSMENT — LOCATION ZONE DERM: LOCATION ZONE: NOSE

## 2024-03-14 NOTE — PROCEDURE: MIPS QUALITY
Quality 143: Oncology: Medical And Radiation- Pain Intensity Quantified: Pain severity quantified, no pain present
Quality 130: Documentation Of Current Medications In The Medical Record: Current Medications Documented
Detail Level: Detailed
Quality 431: Preventive Care And Screening: Unhealthy Alcohol Use - Screening: Patient not identified as an unhealthy alcohol user when screened for unhealthy alcohol use using a systematic screening method
Quality 226: Preventive Care And Screening: Tobacco Use: Screening And Cessation Intervention: Patient screened for tobacco use and is an ex/non-smoker

## 2024-03-14 NOTE — PROCEDURE: MOHS SURGERY
Body Location Override (Optional - Billing Will Still Be Based On Selected Body Map Location If Applicable): Right Nasal Ala
Mohs Case Number: [id number]
Referring Physician (Optional): James Del Rosario III, MD
Consent Type: Consent 2
Eye Shield Used: No
Initial Size Of Lesion: 1
Primary Defect Length In Cm (Final Defect Size - Required For Flaps/Grafts): 1.3
Primary Defect Width In Cm (Final Defect Size - Required For Flaps/Grafts): 1.2
Repair Type: No repair - secondary intention
Which Instrument Did You Use For Dermabrasion?: Wire Brush
Which Eyelid Repair Cpt Are You Using?: 12046
Oculoplastic Surgeon Procedure Text (A): After obtaining clear surgical margins the patient was sent to oculoplastics for surgical repair.  The patient understands they will receive post-surgical care and follow-up from the referring physician's office.
Otolaryngologist Procedure Text (A): After obtaining clear surgical margins the patient was sent to otolaryngology for surgical repair.  The patient understands they will receive post-surgical care and follow-up from the referring physician's office.
Plastic Surgeon Procedure Text (A): After obtaining clear surgical margins the patient was sent to plastics for surgical repair.  The patient understands they will receive post-surgical care and follow-up from the referring physician's office.
Mid-Level Procedure Text (A): After obtaining clear surgical margins the patient was sent to a mid-level provider for surgical repair.  The patient understands they will receive post-surgical care and follow-up from the mid-level provider.
Provider Procedure Text (A): After obtaining clear surgical margins the defect was repaired by another provider.
Asc Procedure Text (A): After obtaining clear surgical margins the patient was sent to an ASC for surgical repair.  The patient understands they will receive post-surgical care and follow-up from the ASC physician.
Simple / Intermediate / Complex Repair - Final Wound Length In Cm: 0
Suturegard Retention Suture: 2-0 Nylon
Retention Suture Bite Size: 3 mm
Length To Time In Minutes Device Was In Place: 10
Undermining Type: Entire Wound
Debridement Text: The wound edges were debrided prior to proceeding with the closure to facilitate wound healing.
Helical Rim Text: The closure involved the helical rim.
Vermilion Border Text: The closure involved the vermilion border.
Nostril Rim Text: The closure involved the nostril rim.
Retention Suture Text: Retention sutures were placed to support the closure and prevent dehiscence.
Include Size Of Lesion In Location Indication Statement: Yes
Area H Indication Text: Tumors in this location are included in Area H (eyelids, eyebrows, nose, lips, chin, ear, pre-auricular, post-auricular, temple, genitalia, hands, feet, ankles and areola).  Tissue conservation is critical in these anatomic locations.
Area M Indication Text: Tumors in this location are included in Area M (cheek, forehead, scalp, neck, jawline and pretibial skin).  Mohs surgery is indicated for tumors in these anatomic locations.
Area L Indication Text: Tumors in this location are included in Area L (trunk and extremities).  Mohs surgery is indicated for larger tumors, or tumors with aggressive histologic features, in these anatomic locations.
Special Stains Stage 1 - Results: Base On Clearance Noted Above
Stage 2: Additional Anesthesia Type: 1% lidocaine with epinephrine
Staging Info: By selecting yes to the question above you will include information on AJCC 8 tumor staging in your Mohs note. Information on tumor staging will be automatically added for SCCs on the head and neck. AJCC 8 includes tumor size, tumor depth, perineural involvement and bone invasion.
Tumor Depth: Less than 6mm from granular layer and no invasion beyond the subcutaneous fat
Why Was The Change Made?: Please Select the Appropriate Response
Medical Necessity Statement: Based on my medical judgement, Mohs surgery is the most appropriate treatment for this cancer compared to other treatments.
Alternatives Discussed Intro (Do Not Add Period): I discussed alternative treatments to Mohs surgery and specifically discussed the risks and benefits of
Consent 1/Introductory Paragraph: The rationale for Mohs was explained to the patient and consent was obtained. The risks, benefits and alternatives to therapy were discussed in detail. Specifically, the risks of infection, scarring, bleeding, prolonged wound healing, incomplete removal, allergy to anesthesia, nerve injury and recurrence were addressed. Prior to the procedure, the treatment site was clearly identified and confirmed by the patient. All components of Universal Protocol/PAUSE Rule completed.
Consent 2/Introductory Paragraph: Mohs surgery was explained to the patient and consent was obtained. The risks, benefits and alternatives to therapy were discussed in detail. Specifically, the risks of infection, scarring, bleeding, prolonged wound healing, incomplete removal, allergy to anesthesia, nerve injury and recurrence were addressed. Prior to the procedure, the treatment site was clearly identified and confirmed by the patient. All components of Universal Protocol/PAUSE Rule completed.
Consent 3/Introductory Paragraph: I gave the patient a chance to ask questions they had about the procedure.  Following this I explained the Mohs procedure and consent was obtained. The risks, benefits and alternatives to therapy were discussed in detail. Specifically, the risks of infection, scarring, bleeding, prolonged wound healing, incomplete removal, allergy to anesthesia, nerve injury and recurrence were addressed. Prior to the procedure, the treatment site was clearly identified and confirmed by the patient. All components of Universal Protocol/PAUSE Rule completed.
Consent (Temporal Branch)/Introductory Paragraph: The rationale for Mohs was explained to the patient and consent was obtained. The risks, benefits and alternatives to therapy were discussed in detail. Specifically, the risks of damage to the temporal branch of the facial nerve, infection, scarring, bleeding, prolonged wound healing, incomplete removal, allergy to anesthesia, and recurrence were addressed. Prior to the procedure, the treatment site was clearly identified and confirmed by the patient. All components of Universal Protocol/PAUSE Rule completed.
Consent (Marginal Mandibular)/Introductory Paragraph: The rationale for Mohs was explained to the patient and consent was obtained. The risks, benefits and alternatives to therapy were discussed in detail. Specifically, the risks of damage to the marginal mandibular branch of the facial nerve, infection, scarring, bleeding, prolonged wound healing, incomplete removal, allergy to anesthesia, and recurrence were addressed. Prior to the procedure, the treatment site was clearly identified and confirmed by the patient. All components of Universal Protocol/PAUSE Rule completed.
Consent (Spinal Accessory)/Introductory Paragraph: The rationale for Mohs was explained to the patient and consent was obtained. The risks, benefits and alternatives to therapy were discussed in detail. Specifically, the risks of damage to the spinal accessory nerve, infection, scarring, bleeding, prolonged wound healing, incomplete removal, allergy to anesthesia, and recurrence were addressed. Prior to the procedure, the treatment site was clearly identified and confirmed by the patient. All components of Universal Protocol/PAUSE Rule completed.
Consent (Near Eyelid Margin)/Introductory Paragraph: The rationale for Mohs was explained to the patient and consent was obtained. The risks, benefits and alternatives to therapy were discussed in detail. Specifically, the risks of ectropion or eyelid deformity, infection, scarring, bleeding, prolonged wound healing, incomplete removal, allergy to anesthesia, nerve injury and recurrence were addressed. Prior to the procedure, the treatment site was clearly identified and confirmed by the patient. All components of Universal Protocol/PAUSE Rule completed.
Consent (Ear)/Introductory Paragraph: The rationale for Mohs was explained to the patient and consent was obtained. The risks, benefits and alternatives to therapy were discussed in detail. Specifically, the risks of ear deformity, infection, scarring, bleeding, prolonged wound healing, incomplete removal, allergy to anesthesia, nerve injury and recurrence were addressed. Prior to the procedure, the treatment site was clearly identified and confirmed by the patient. All components of Universal Protocol/PAUSE Rule completed.
Consent (Nose)/Introductory Paragraph: The rationale for Mohs was explained to the patient and consent was obtained. The risks, benefits and alternatives to therapy were discussed in detail. Specifically, the risks of nasal deformity, changes in the flow of air through the nose, infection, scarring, bleeding, prolonged wound healing, incomplete removal, allergy to anesthesia, nerve injury and recurrence were addressed. Prior to the procedure, the treatment site was clearly identified and confirmed by the patient. All components of Universal Protocol/PAUSE Rule completed.
Consent (Lip)/Introductory Paragraph: The rationale for Mohs was explained to the patient and consent was obtained. The risks, benefits and alternatives to therapy were discussed in detail. Specifically, the risks of lip deformity, changes in the oral aperture, infection, scarring, bleeding, prolonged wound healing, incomplete removal, allergy to anesthesia, nerve injury and recurrence were addressed. Prior to the procedure, the treatment site was clearly identified and confirmed by the patient. All components of Universal Protocol/PAUSE Rule completed.
Consent (Scalp)/Introductory Paragraph: The rationale for Mohs was explained to the patient and consent was obtained. The risks, benefits and alternatives to therapy were discussed in detail. Specifically, the risks of changes in hair growth pattern secondary to repair, infection, scarring, bleeding, prolonged wound healing, incomplete removal, allergy to anesthesia, nerve injury and recurrence were addressed. Prior to the procedure, the treatment site was clearly identified and confirmed by the patient. All components of Universal Protocol/PAUSE Rule completed.
Detail Level: Detailed
Postop Diagnosis: same
Surgeon: Robyn Pardo MPhil, MD
Anesthesia Type: 1% lidocaine with epinephrine and a 1:10 solution of 8.4% sodium bicarbonate
Anesthesia Volume In Cc: 4
Hemostasis: Electrofulguration
Estimated Blood Loss (Cc): minimal
Anesthesia Volume In Cc: 6
Brow Lift Text: A midfrontal incision was made medially to the defect to allow access to the tissues just superior to the left eyebrow. Following careful dissection inferiorly in a supraperiosteal plane to the level of the left eyebrow, several 3-0 monocryl sutures were used to resuspend the eyebrow orbicularis oculi muscular unit to the superior frontal bone periosteum. This resulted in an appropriate reapproximation of static eyebrow symmetry and correction of the left brow ptosis.
Deep Sutures: 5-0 PGLC
Epidermal Sutures: 6-0 Polypropylene
Epidermal Closure: running and interrupted
Suturegard Intro: Intraoperative tissue expansion was performed, utilizing the SUTUREGARD device, in order to reduce wound tension.
Suturegard Body: The suture ends were repeatedly re-tightened and re-clamped to achieve the desired tissue expansion.
Hemigard Intro: Due to skin fragility and wound tension, it was decided to use HEMIGARD adhesive retention suture devices to permit a linear closure. The skin was cleaned and dried for a 6cm distance away from the wound. Excessive hair, if present, was removed to allow for adhesion.
Hemigard Postcare Instructions: The HEMIGARD strips are to remain completely dry for at least 5-7 days.
Donor Site Anesthesia Type: same as repair anesthesia
Epidermal Closure Graft Donor Site (Optional): simple interrupted
Graft Donor Site Bandage (Optional-Leave Blank If You Don't Want In Note): Steri-strips and a pressure bandage were applied to the donor site.
Closure 2 Information: This tab is for additional flaps and grafts, including complex repair and grafts and complex repair and flaps. You can also specify a different location for the additional defect, if the location is the same you do not need to select a new one. We will insert the automated text for the repair you select below just as we do for solitary flaps and grafts. Please note that at this time if you select a location with a different insurance zone you will need to override the ICD10 and CPT if appropriate.
Closure 3 Information: This tab is for additional flaps and grafts above and beyond our usual structured repairs.  Please note if you enter information here it will not currently bill and you will need to add the billing information manually.
Wound Care: Petrolatum
Dressing: dry sterile dressing
Suture Removal: 7 days
Unna Boot Text: An Unna boot was placed to help immobilize the limb and facilitate more rapid healing.
Home Suture Removal Text: Patient was provided instructions on removing sutures and will remove their sutures at home.  If they have any questions or difficulties they will call the office.
Post-Care Instructions: I reviewed with the patient in detail post-care instructions. Patient is not to engage in any heavy lifting, exercise, or swimming for the next 14 days. Should the patient develop any fevers, chills, bleeding, severe pain patient will contact the office immediately.
Pain Refusal Text: I offered to prescribe pain medication but the patient refused to take this medication.
Mauc Instructions: By selecting yes to the question below the MAUC number will be added into the note.  This will be calculated automatically based on the diagnosis chosen, the size entered, the body zone selected (H,M,L) and the specific indications you chose. You will also have the option to override the Mohs AUC if you disagree with the automatically calculated number and this option is found in the Case Summary tab.
Where Do You Want The Question To Include Opioid Counseling Located?: Case Summary Tab
Eye Protection Verbiage: Before proceeding with the stage, a plastic scleral shield was inserted. The globe was anesthetized with a few drops of 1% lidocaine with 1:100,000 epinephrine. Then, an appropriate sized scleral shield was chosen and coated with lacrilube ointment. The shield was gently inserted and left in place for the duration of each stage. After the stage was completed, the shield was gently removed.
Mohs Method Verbiage: An incision at a 45 degree angle following the standard Mohs approach was done and the specimen was harvested as a microscopic controlled layer.
Surgeon/Pathologist Verbiage (Will Incorporate Name Of Surgeon From Intro If Not Blank): operated in two distinct and integrated capacities as the surgeon and pathologist.
Mohs Histo Method Verbiage: Each section was then chromacoded and processed in the Mohs lab using the Mohs protocol and submitted for frozen section, utilizing hematoxylin and eosin histochemical stains.
Subsequent Stages Histo Method Verbiage: Using a similar technique to that described above, a thin layer of tissue was removed from all areas where tumor was visible on the previous stage.  The tissue was again oriented, mapped, dyed, and processed as above.
Mohs Rapid Report Verbiage: The area of clinically evident tumor was marked with skin marking ink and appropriately hatched.  The initial incision was made following the Mohs approach through the skin.  The specimen was taken to the lab, divided into the necessary number of pieces, chromacoded and processed according to the Mohs protocol.  This was repeated in successive stages until a tumor free defect was achieved.
Complex Repair Preamble Text (Leave Blank If You Do Not Want): Extensive wide and differential undermining were performed.  During reconstruction, hemostasis was performed using electrofulguration.  Initial buried interrupted vertical mattress suture(s) defined redundant cutaneous columns (Burow's triangles) which were removed to the level of the adipose tissue using a #15 blade scalpel and the triangulation technique.  Hemostasis was obtained and the resulting defect was repaired with the same suture material and techniques.  The epidermis was then carefully apposed using polypropylene suture (running).  The wound was stressed in various directions to insure the adequacy of closure and of hemostasis.  The wound edges were pink and well perfused.  Reconstruction was considered complete.
Intermediate Repair Preamble Text (Leave Blank If You Do Not Want): Undermining was performed with blunt dissection.
Crescentic Complex Repair Preamble Text (Leave Blank If You Do Not Want): Extensive wide undermining was performed.
Non-Graft Cartilage Fenestration Text: The cartilage was fenestrated with a 2mm punch biopsy to help facilitate healing.
Graft Cartilage Fenestration Text: The cartilage was fenestrated with a 2mm punch biopsy to help facilitate graft survival and healing.
Secondary Intention Text (Leave Blank If You Do Not Want): The defect will heal with secondary intention.
No Repair - Repaired With Adjacent Surgical Defect Text (Leave Blank If You Do Not Want): After obtaining clear surgical margins the defect was repaired concurrently with another surgical defect which was in close approximation.
Adjacent Tissue Transfer Text: The defect edges were debeveled with a #15 scalpel blade. Given the location of the defect and the proximity to free margins an adjacent tissue transfer was deemed most appropriate. Using a sterile surgical marker, an appropriate flap was drawn incorporating the defect and placing the expected incisions within the relaxed skin tension lines where possible. The area thus outlined was incised deep to adipose tissue with a #15 scalpel blade. The skin margins were undermined to an appropriate distance in all directions utilizing iris scissors and carried over to close the primary defect.
Advancement Flap (Single) Text: In order to preserve normal anatomic and functional relationships, a single advancement flap was deemed the most appropriate reconstructive procedure.  The beveled edges of the Mohs defect were excised with a #15 scalpel blade.    The flap was designed to fall along relaxed skin tension lines and/or free margins, incised, and elevated using blunt curved tissue scissors.  Hemostasis was achieved.  Interrupted buried vertical mattress sutures were employed to carry over and secure the flap in place.  Redundant cutaneous columns defined by the flap's movement were removed to the adipose layer using the triangulation technique and repaired in similar fashion.  Final epidermal approximation along the length of the flap was achieved using epidermal sutures.  The wound was stressed in various directions to ensure the adequacy of the closure and of hemostasis.  The flap edges appeared pink and well perfused.  Reconstruction was considered complete.
Advancement Flap (Double) Text: In order to preserve normal anatomic and functional relationships, a double advancement flap was deemed the most appropriate reconstructive procedure.  The beveled edges of the Mohs defect were excised with a #15 scalpel blade.    The flaps were designed to fall along relaxed skin tension lines and/or free margins, incised, and elevated using blunt curved tissue scissors.  Hemostasis was achieved.  Interrupted buried vertical mattress sutures were employed to carry over and secure the flaps in place.  Redundant cutaneous columns defined by the flaps' movements were removed to the adipose layer using the triangulation technique and repaired in similar fashion.  Final epidermal approximation along the length of the flap was achieved using epidermal sutures.  The wound was stressed in various directions to ensure the adequacy of the closure and of hemostasis.  The flap edges appeared pink and well perfused.  Reconstruction was considered complete.
Burow's Advancement Flap Text: The defect edges were debeveled with a #15 scalpel blade. Given the location of the defect and the proximity to free margins a Burow's advancement flap was deemed most appropriate. Using a sterile surgical marker, the appropriate advancement flap was drawn incorporating the defect and placing the expected incisions within the relaxed skin tension lines where possible. The area thus outlined was incised deep to adipose tissue with a #15 scalpel blade. The skin margins were undermined to an appropriate distance in all directions utilizing iris scissors. Following this, the designed flap was advanced and carried over into the primary defect and sutured into place.
Chonodrocutaneous Helical Advancement Flap Text: The defect edges were debeveled with a #15 scalpel blade. Given the location of the defect and the proximity to free margins a chondrocutaneous helical advancement flap was deemed most appropriate. Using a sterile surgical marker, the appropriate advancement flap was drawn incorporating the defect and placing the expected incisions within the relaxed skin tension lines where possible. The area thus outlined was incised deep to adipose tissue with a #15 scalpel blade. The skin margins were undermined to an appropriate distance in all directions utilizing iris scissors. Following this, the designed flap was advanced and carried over into the primary defect and sutured into place.
Crescentic Advancement Flap Text: The defect edges were debeveled with a #15 scalpel blade. Given the location of the defect and the proximity to free margins a crescentic advancement flap was deemed most appropriate. Using a sterile surgical marker, the appropriate advancement flap was drawn incorporating the defect and placing the expected incisions within the relaxed skin tension lines where possible. The area thus outlined was incised deep to adipose tissue with a #15 scalpel blade. The skin margins were undermined to an appropriate distance in all directions utilizing iris scissors. Following this, the designed flap was advanced and carried over into the primary defect and sutured into place.
A-T Advancement Flap Text: The defect edges were debeveled with a #15 scalpel blade. Given the location of the defect, shape of the defect and the proximity to free margins an A-T advancement flap was deemed most appropriate. Using a sterile surgical marker, an appropriate advancement flap was drawn incorporating the defect and placing the expected incisions within the relaxed skin tension lines where possible. The area thus outlined was incised deep to adipose tissue with a #15 scalpel blade. The skin margins were undermined to an appropriate distance in all directions utilizing iris scissors. Following this, the designed flap was advanced and carried over into the primary defect and sutured into place.
O-T Advancement Flap Text: In order to preserve normal anatomic and functional relationships, an O-T advancement flap was deemed the most appropriate reconstructive procedure.  The beveled edges of the Mohs defect were excised with a #15 scalpel blade.    The flap was designed to fall along relaxed skin tension lines and/or free margins, incised, and elevated using blunt curved tissue scissors.  Hemostasis was achieved.  Interrupted buried vertical mattress sutures were employed to carry over and secure the flap in place.  Redundant cutaneous columns defined by the flap's movement were removed to the adipose layer using the triangulation technique and repaired in similar fashion.  Final epidermal approximation along the length of the flap was achieved using epidermal sutures.  The wound was stressed in various directions to ensure the adequacy of the closure and of hemostasis.  The flap edges appeared pink and well perfused.  Reconstruction was considered complete.
O-L Flap Text: The defect edges were debeveled with a #15 scalpel blade. Given the location of the defect, shape of the defect and the proximity to free margins an O-L flap was deemed most appropriate. Using a sterile surgical marker, an appropriate advancement flap was drawn incorporating the defect and placing the expected incisions within the relaxed skin tension lines where possible. The area thus outlined was incised deep to adipose tissue with a #15 scalpel blade. The skin margins were undermined to an appropriate distance in all directions utilizing iris scissors. Following this, the designed flap was advanced and carried over into the primary defect and sutured into place.
O-Z Flap Text: The defect edges were debeveled with a #15 scalpel blade. Given the location of the defect, shape of the defect and the proximity to free margins an O-Z flap was deemed most appropriate. Using a sterile surgical marker, an appropriate transposition flap was drawn incorporating the defect and placing the expected incisions within the relaxed skin tension lines where possible. The area thus outlined was incised deep to adipose tissue with a #15 scalpel blade. The skin margins were undermined to an appropriate distance in all directions utilizing iris scissors. Following this, the designed flap was carried over into the primary defect and sutured into place.
Double O-Z Flap Text: The defect edges were debeveled with a #15 scalpel blade. Given the location of the defect, shape of the defect and the proximity to free margins a Double O-Z flap was deemed most appropriate. Using a sterile surgical marker, an appropriate transposition flap was drawn incorporating the defect and placing the expected incisions within the relaxed skin tension lines where possible. The area thus outlined was incised deep to adipose tissue with a #15 scalpel blade. The skin margins were undermined to an appropriate distance in all directions utilizing iris scissors. Following this, the designed flap was carried over into the primary defect and sutured into place.
V-Y Flap Text: The defect edges were debeveled with a #15 scalpel blade. Given the location of the defect, shape of the defect and the proximity to free margins a V-Y flap was deemed most appropriate. Using a sterile surgical marker, an appropriate advancement flap was drawn incorporating the defect and placing the expected incisions within the relaxed skin tension lines where possible. The area thus outlined was incised deep to adipose tissue with a #15 scalpel blade. The skin margins were undermined to an appropriate distance in all directions utilizing iris scissors. Following this, the designed flap was advanced and carried over into the primary defect and sutured into place.
Advancement-Rotation Flap Text: The defect edges were debeveled with a #15 scalpel blade. Given the location of the defect, shape of the defect and the proximity to free margins an advancement-rotation flap was deemed most appropriate. Using a sterile surgical marker, an appropriate flap was drawn incorporating the defect and placing the expected incisions within the relaxed skin tension lines where possible. The area thus outlined was incised deep to adipose tissue with a #15 scalpel blade. The skin margins were undermined to an appropriate distance in all directions utilizing iris scissors. Following this, the designed flap was carried over into the primary defect and sutured into place.
Mercedes Flap Text: The defect edges were debeveled with a #15 scalpel blade. Given the location of the defect, shape of the defect and the proximity to free margins a Mercedes flap was deemed most appropriate. Using a sterile surgical marker, an appropriate advancement flap was drawn incorporating the defect and placing the expected incisions within the relaxed skin tension lines where possible. The area thus outlined was incised deep to adipose tissue with a #15 scalpel blade. The skin margins were undermined to an appropriate distance in all directions utilizing iris scissors. Following this, the designed flap was advanced and carried over into the primary defect and sutured into place.
Modified Advancement Flap Text: The defect edges were debeveled with a #15 scalpel blade. Given the location of the defect, shape of the defect and the proximity to free margins a modified advancement flap was deemed most appropriate. Using a sterile surgical marker, an appropriate advancement flap was drawn incorporating the defect and placing the expected incisions within the relaxed skin tension lines where possible. The area thus outlined was incised deep to adipose tissue with a #15 scalpel blade. The skin margins were undermined to an appropriate distance in all directions utilizing iris scissors. Following this, the designed flap was advanced and carried over into the primary defect and sutured into place.
Mucosal Advancement Flap Text: Given the location of the defect, shape of the defect and the proximity to free margins a mucosal advancement flap was deemed most appropriate. Incisions were made with a 15 blade scalpel in the appropriate fashion along the cutaneous vermilion border and the mucosal lip. The remaining actinically damaged mucosal tissue was excised.  The mucosal advancement flap was then elevated to the gingival sulcus with care taken to preserve the neurovascular structures and advanced into the primary defect. Care was taken to ensure that precise realignment of the vermilion border was achieved.
Peng Advancement Flap Text: The defect edges were debeveled with a #15 scalpel blade. Given the location of the defect, shape of the defect and the proximity to free margins a Peng advancement flap was deemed most appropriate. Using a sterile surgical marker, an appropriate advancement flap was drawn incorporating the defect and placing the expected incisions within the relaxed skin tension lines where possible. The area thus outlined was incised deep to adipose tissue with a #15 scalpel blade. The skin margins were undermined to an appropriate distance in all directions utilizing iris scissors. Following this, the designed flap was advanced and carried over into the primary defect and sutured into place.
Hatchet Flap Text: The defect edges were debeveled with a #15 scalpel blade. Given the location of the defect, shape of the defect and the proximity to free margins a hatchet flap was deemed most appropriate. Using a sterile surgical marker, an appropriate hatchet flap was drawn incorporating the defect and placing the expected incisions within the relaxed skin tension lines where possible. The area thus outlined was incised deep to adipose tissue with a #15 scalpel blade. The skin margins were undermined to an appropriate distance in all directions utilizing iris scissors. Following this, the designed flap was carried over into the primary defect and sutured into place.
Rotation Flap Text: The defect edges were debeveled with a #15 scalpel blade. Given the location of the defect, shape of the defect and the proximity to free margins a rotation flap was deemed most appropriate. Using a sterile surgical marker, an appropriate rotation flap was drawn incorporating the defect and placing the expected incisions within the relaxed skin tension lines where possible. The area thus outlined was incised deep to adipose tissue with a #15 scalpel blade. The skin margins were undermined to an appropriate distance in all directions utilizing iris scissors. Following this, the designed flap was carried over into the primary defect and sutured into place.
Bilateral Rotation Flap Text: The defect edges were debeveled with a #15 scalpel blade. Given the location of the defect, shape of the defect and the proximity to free margins a bilateral rotation flap was deemed most appropriate. Using a sterile surgical marker, an appropriate rotation flap was drawn incorporating the defect and placing the expected incisions within the relaxed skin tension lines where possible. The area thus outlined was incised deep to adipose tissue with a #15 scalpel blade. The skin margins were undermined to an appropriate distance in all directions utilizing iris scissors. Following this, the designed flap was carried over into the primary defect and sutured into place.
Spiral Flap Text: The defect edges were debeveled with a #15 scalpel blade. Given the location of the defect, shape of the defect and the proximity to free margins a spiral flap was deemed most appropriate. Using a sterile surgical marker, an appropriate rotation flap was drawn incorporating the defect and placing the expected incisions within the relaxed skin tension lines where possible. The area thus outlined was incised deep to adipose tissue with a #15 scalpel blade. The skin margins were undermined to an appropriate distance in all directions utilizing iris scissors. Following this, the designed flap was carried over into the primary defect and sutured into place.
Staged Advancement Flap Text: The defect edges were debeveled with a #15 scalpel blade. Given the location of the defect, shape of the defect and the proximity to free margins a staged advancement flap was deemed most appropriate. Using a sterile surgical marker, an appropriate advancement flap was drawn incorporating the defect and placing the expected incisions within the relaxed skin tension lines where possible. The area thus outlined was incised deep to adipose tissue with a #15 scalpel blade. The skin margins were undermined to an appropriate distance in all directions utilizing iris scissors. Following this, the designed flap was carried over into the primary defect and sutured into place.
Star Wedge Flap Text: The defect edges were debeveled with a #15 scalpel blade. Given the location of the defect, shape of the defect and the proximity to free margins a star wedge flap was deemed most appropriate. Using a sterile surgical marker, an appropriate rotation flap was drawn incorporating the defect and placing the expected incisions within the relaxed skin tension lines where possible. The area thus outlined was incised deep to adipose tissue with a #15 scalpel blade. The skin margins were undermined to an appropriate distance in all directions utilizing iris scissors. Following this, the designed flap was carried over into the primary defect and sutured into place.
Transposition Flap Text: The defect edges were debeveled with a #15 scalpel blade. Given the location of the defect and the proximity to free margins a transposition flap was deemed most appropriate. Using a sterile surgical marker, an appropriate transposition flap was drawn incorporating the defect. The area thus outlined was incised deep to adipose tissue with a #15 scalpel blade. The skin margins were undermined to an appropriate distance in all directions utilizing iris scissors. Following this, the designed flap was carried over into the primary defect and sutured into place.
Muscle Hinge Flap Text: The defect edges were debeveled with a #15 scalpel blade.  Given the size, depth and location of the defect and the proximity to free margins a muscle hinge flap was deemed most appropriate. Using a sterile surgical marker, an appropriate hinge flap was drawn incorporating the defect. The area thus outlined was incised with a #15 scalpel blade. The skin margins were undermined to an appropriate distance in all directions utilizing iris scissors. Following this, the designed flap was carried into the primary defect and sutured into place.
Mustarde Flap Text: The defect edges were debeveled with a #15 scalpel blade.  Given the size, depth and location of the defect and the proximity to free margins a Mustarde flap was deemed most appropriate. Using a sterile surgical marker, an appropriate flap was drawn incorporating the defect. The area thus outlined was incised with a #15 scalpel blade. The skin margins were undermined to an appropriate distance in all directions utilizing iris scissors. Following this, the designed flap was carried into the primary defect and sutured into place.
Nasal Turnover Hinge Flap Text: The defect edges were debeveled with a #15 scalpel blade.  Given the size, depth, location of the defect and the defect being full thickness a nasal turnover hinge flap was deemed most appropriate. Using a sterile surgical marker, an appropriate hinge flap was drawn incorporating the defect. The area thus outlined was incised with a #15 scalpel blade. The flap was designed to recreate the nasal mucosal lining and the alar rim. The skin margins were undermined to an appropriate distance in all directions utilizing iris scissors. Following this, the designed flap was carried over into the primary defect and sutured into place
Nasalis-Muscle-Based Myocutaneous Island Pedicle Flap Text: Using a #15 blade, an incision was made around the donor flap to the level of the nasalis muscle. Wide lateral undermining was then performed in both the subcutaneous plane above the nasalis muscle, and in a submuscular plane just above periosteum. This allowed the formation of a free nasalis muscle axial pedicle (based on the angular artery) which was still attached to the actual cutaneous flap, increasing its mobility and vascular viability. Hemostasis was obtained with pinpoint electrocoagulation. The flap was mobilized into position and the pivotal anchor points positioned and stabilized with buried interrupted sutures. Subcutaneous and dermal tissues were closed in a multilayered fashion with sutures. Tissue redundancies were excised, and the epidermal edges were apposed without significant tension and sutured with sutures.
Nasalis Myocutaneous Flap Text: Using a #15 blade, an incision was made around the donor flap to the level of the nasalis muscle. Wide lateral undermining was then performed in both the subcutaneous plane above the nasalis muscle, and in a submuscular plane just above periosteum. This allowed the formation of a free nasalis muscle axial pedicle which was still attached to the actual cutaneous flap, increasing its mobility and vascular viability. Hemostasis was obtained with pinpoint electrocoagulation. The flap was mobilized into position and the pivotal anchor points positioned and stabilized with buried interrupted sutures. Subcutaneous and dermal tissues were closed in a multilayered fashion with sutures. Tissue redundancies were excised, and the epidermal edges were apposed without significant tension and sutured with sutures.
Orbicularis Oris Muscle Flap Text: The defect edges were debeveled with a #15 scalpel blade.  Given that the defect affected the competency of the oral sphincter an orbicularis oris muscle flap was deemed most appropriate to restore this competency and normal muscle function.  Using a sterile surgical marker, an appropriate flap was drawn incorporating the defect. The area thus outlined was incised with a #15 scalpel blade. Following this, the designed flap was carried over into the primary defect and sutured into place.
Melolabial Transposition Flap Text: In order to preserve normal anatomic and functional relationships, a melolabial transposition flap was deemed the most appropriate reconstructive procedure.  The beveled edges of the Mohs defect were excised with a #15 scalpel blade.    The flap was designed to fall along relaxed skin tension lines and/or free margins, incised, and elevated using blunt curved tissue scissors.  Hemostasis was achieved.  Interrupted buried vertical mattress sutures were employed to carry over (transpose) and secure the flap in place.  Redundant cutaneous columns defined by the flap's movement were removed to the adipose layer using the triangulation technique and repaired in similar fashion.  Final epidermal approximation along the length of the flap was achieved using epidermal sutures.  The wound was stressed in various directions to ensure the adequacy of the closure and of hemostasis.  The flap edges appeared pink and well perfused.  Reconstruction was considered complete.
Rectangular Flap Text: The defect edges were debeveled with a #15 scalpel blade. Given the location of the defect and the proximity to free margins a rectangular flap was deemed most appropriate. Using a sterile surgical marker, an appropriate rectangular flap was drawn incorporating the defect. The area thus outlined was incised deep to adipose tissue with a #15 scalpel blade. The skin margins were undermined to an appropriate distance in all directions utilizing iris scissors. Following this, the designed flap was carried over into the primary defect and sutured into place.
Rhombic Flap Text: In order to preserve normal anatomic and functional relationships, a rhombic flap was deemed the most appropriate reconstructive procedure.  The beveled edges of the Mohs defect were excised with a #15 scalpel blade.    The flap was designed to fall along relaxed skin tension lines and/or free margins, incised, and elevated using blunt curved tissue scissors.  Hemostasis was achieved.  Interrupted buried vertical mattress sutures were employed to carry over (transpose) and secure the flap in place.  Redundant cutaneous columns defined by the flap's movement were removed to the adipose layer using the triangulation technique and repaired in similar fashion.  Final epidermal approximation along the length of the flap was achieved using epidermal sutures.  The wound was stressed in various directions to ensure the adequacy of the closure and of hemostasis.  The flap edges appeared pink and well perfused.  Reconstruction was considered complete.
Rhomboid Transposition Flap Text: The defect edges were debeveled with a #15 scalpel blade. Given the location of the defect and the proximity to free margins a rhomboid transposition flap was deemed most appropriate. Using a sterile surgical marker, an appropriate rhomboid flap was drawn incorporating the defect. The area thus outlined was incised deep to adipose tissue with a #15 scalpel blade. The skin margins were undermined to an appropriate distance in all directions utilizing iris scissors. Following this, the designed flap was carried over into the primary defect and sutured into place.
Bi-Rhombic Flap Text: The defect edges were debeveled with a #15 scalpel blade. Given the location of the defect and the proximity to free margins a bi-rhombic flap was deemed most appropriate. Using a sterile surgical marker, an appropriate rhombic flap was drawn incorporating the defect. The area thus outlined was incised deep to adipose tissue with a #15 scalpel blade. The skin margins were undermined to an appropriate distance in all directions utilizing iris scissors. Following this, the designed flap was carried over into the primary defect and sutured into place.
Helical Rim Advancement Flap Text: The defect edges were debeveled with a #15 blade scalpel.  Given the location of the defect and the proximity to free margins (helical rim) a double helical rim advancement flap was deemed most appropriate. Using a sterile surgical marker, the appropriate advancement flaps were drawn incorporating the defect and placing the expected incisions between the helical rim and antihelix where possible.  The area thus outlined was incised through and through with a #15 scalpel blade.  With a skin hook and iris scissors, the flaps were gently and sharply undermined and freed up. Folllowing this, the designed flaps were carried over into the primary defect and sutured into place.
Bilateral Helical Rim Advancement Flap Text: The defect edges were debeveled with a #15 blade scalpel.  Given the location of the defect and the proximity to free margins (helical rim) a bilateral helical rim advancement flap was deemed most appropriate. Using a sterile surgical marker, the appropriate advancement flaps were drawn incorporating the defect and placing the expected incisions between the helical rim and antihelix where possible.  The area thus outlined was incised through and through with a #15 scalpel blade.  With a skin hook and iris scissors, the flaps were gently and sharply undermined and freed up. Following this, the designed flaps were placed into the primary defect and sutured into place.
Ear Star Wedge Flap Text: The defect edges were debeveled with a #15 blade scalpel.  Given the location of the defect and the proximity to free margins (helical rim) an ear star wedge flap was deemed most appropriate. Using a sterile surgical marker, the appropriate flap was drawn incorporating the defect and placing the expected incisions between the helical rim and antihelix where possible.  The area thus outlined was incised through and through with a #15 scalpel blade. Following this, the designed flap was carried over into the primary defect and sutured into place.
Banner Transposition Flap Text: The defect edges were debeveled with a #15 scalpel blade. Given the location of the defect and the proximity to free margins a Banner transposition flap was deemed most appropriate. Using a sterile surgical marker, an appropriate flap was drawn around the defect. The area thus outlined was incised deep to adipose tissue with a #15 scalpel blade. The skin margins were undermined to an appropriate distance in all directions utilizing iris scissors. Following this, the designed flap was carried into the primary defect and sutured into place.
Bilobed Flap Text: The defect edges were debeveled with a #15 scalpel blade. Given the location of the defect and the proximity to free margins a bilobe flap was deemed most appropriate. Using a sterile surgical marker, an appropriate bilobe flap drawn around the defect. The area thus outlined was incised deep to adipose tissue with a #15 scalpel blade. The skin margins were undermined to an appropriate distance in all directions utilizing iris scissors. Following this, the designed flap was carried over into the primary defect and sutured into place.
Bilobed Transposition Flap Text: The defect edges were debeveled with a #15 scalpel blade. Given the location of the defect and the proximity to free margins a bilobed transposition flap was deemed most appropriate. Using a sterile surgical marker, an appropriate bilobe flap drawn around the defect. The area thus outlined was incised deep to adipose tissue with a #15 scalpel blade. The skin margins were undermined to an appropriate distance in all directions utilizing iris scissors. Following this, the designed flap was carried over into the primary defect and sutured into place.
Trilobed Flap Text: The defect edges were debeveled with a #15 scalpel blade. Given the location of the defect and the proximity to free margins a trilobed flap was deemed most appropriate. Using a sterile surgical marker, an appropriate trilobed flap was drawn around the defect. The area thus outlined was incised deep to adipose tissue with a #15 scalpel blade. The skin margins were undermined to an appropriate distance in all directions utilizing iris scissors. Following this, the designed flap was carried into the primary defect and sutured into place.
Dorsal Nasal Flap Text: The defect edges were debeveled with a #15 scalpel blade. Given the location of the defect and the proximity to free margins a dorsal nasal flap was deemed most appropriate. Using a sterile surgical marker, an appropriate dorsal nasal flap was drawn around the defect. The area thus outlined was incised deep to adipose tissue with a #15 scalpel blade. The skin margins were undermined to an appropriate distance in all directions utilizing iris scissors. Following this, the designed flap was carried into the primary defect and sutured into place.
Island Pedicle Flap Text: The defect edges were debeveled with a #15 scalpel blade. Given the location, size, and shape of the defect, an island pedicle advancement flap was deemed the most appropriate reconstructive option. An appropriate advancement flap was created incorporating the defect, outlining the appropriate donor tissue and placing the expected incisions within the relaxed skin tension lines where possible. The area thus outlined was incised deep to adipose tissue with a #15 scalpel blade. The skin margins were undermined to an appropriate distance in all directions around the primary defect and laterally outward around the island pedicle utilizing Ragnell scissors.  An appropriate pedicle of adipose and muscular tissue was created toward the advancing flap edge. The flap was carried over into the primary defect and sutured into place, with the secondary defect closed in V-Y fashion.  The flap was then stressed in various directions to assess the adequacy of closure and of hemostasis.  The flap was pink and well perfused.  Reconstruction was considered complete.
Island Pedicle Flap With Canthal Suspension Text: The defect edges were debeveled with a #15 scalpel blade. Given the location of the defect, shape of the defect and the proximity to free margins an island pedicle advancement flap was deemed most appropriate. Using a sterile surgical marker, an appropriate advancement flap was drawn incorporating the defect, outlining the appropriate donor tissue and placing the expected incisions within the relaxed skin tension lines where possible. The area thus outlined was incised deep to adipose tissue with a #15 scalpel blade. The skin margins were undermined to an appropriate distance in all directions around the primary defect and laterally outward around the island pedicle utilizing iris scissors.  There was minimal undermining beneath the pedicle flap. A suspension suture was placed in the canthal tendon to prevent tension and prevent ectropion. Following this, the designed flap was placed into the primary defect and sutured into place.
Alar Island Pedicle Flap Text: The defect edges were debeveled with a #15 scalpel blade. Given the location of the defect, shape of the defect and the proximity to the alar rim an island pedicle advancement flap was deemed most appropriate. Using a sterile surgical marker, an appropriate advancement flap was drawn incorporating the defect, outlining the appropriate donor tissue and placing the expected incisions within the nasal ala running parallel to the alar rim. The area thus outlined was incised with a #15 scalpel blade. The skin margins were undermined minimally to an appropriate distance in all directions around the primary defect and laterally outward around the island pedicle utilizing iris scissors.  There was minimal undermining beneath the pedicle flap. Following this, the designed flap was carried over into the primary defect and sutured into place.
Double Island Pedicle Flap Text: The defect edges were debeveled with a #15 scalpel blade. Given the location of the defect, shape of the defect and the proximity to free margins a double island pedicle advancement flap was deemed most appropriate. Using a sterile surgical marker, an appropriate advancement flap was drawn incorporating the defect, outlining the appropriate donor tissue and placing the expected incisions within the relaxed skin tension lines where possible. The area thus outlined was incised deep to adipose tissue with a #15 scalpel blade. The skin margins were undermined to an appropriate distance in all directions around the primary defect and laterally outward around the island pedicle utilizing iris scissors.  There was minimal undermining beneath the pedicle flap. Following this, the flap was carried over into the primary defect and sutured into place.
Island Pedicle Flap-Requiring Vessel Identification Text: The defect edges were debeveled with a #15 scalpel blade. Given the location of the defect, shape of the defect and the proximity to free margins an island pedicle advancement flap was deemed most appropriate. Using a sterile surgical marker, an appropriate advancement flap was drawn, based on the axial vessel mentioned above, incorporating the defect, outlining the appropriate donor tissue and placing the expected incisions within the relaxed skin tension lines where possible. The area thus outlined was incised deep to adipose tissue with a #15 scalpel blade. The skin margins were undermined to an appropriate distance in all directions around the primary defect and laterally outward around the island pedicle utilizing iris scissors.  There was minimal undermining beneath the pedicle flap. Following this, the designed flap was carried over into the primary defect and sutured into place.
Keystone Flap Text: The defect edges were debeveled with a #15 scalpel blade. Given the location of the defect, shape of the defect a keystone flap was deemed most appropriate. Using a sterile surgical marker, an appropriate keystone flap was drawn incorporating the defect, outlining the appropriate donor tissue and placing the expected incisions within the relaxed skin tension lines where possible. The area thus outlined was incised deep to adipose tissue with a #15 scalpel blade. The skin margins were undermined to an appropriate distance in all directions around the primary defect and laterally outward around the flap utilizing iris scissors. Following this, the designed flap was carried into the primary defect and sutured into place.
O-T Plasty Text: The defect edges were debeveled with a #15 scalpel blade. Given the location of the defect, shape of the defect and the proximity to free margins an O-T plasty was deemed most appropriate. Using a sterile surgical marker, an appropriate O-T plasty was drawn incorporating the defect and placing the expected incisions within the relaxed skin tension lines where possible. The area thus outlined was incised deep to adipose tissue with a #15 scalpel blade. The skin margins were undermined to an appropriate distance in all directions utilizing iris scissors. Following this, the designed flap was carried over into the primary defect and sutured into place.
O-Z Plasty Text: The defect edges were debeveled with a #15 scalpel blade. Given the location of the defect, shape of the defect and the proximity to free margins an O-Z plasty (double transposition flap) was deemed most appropriate. Using a sterile surgical marker, the appropriate transposition flaps were drawn incorporating the defect and placing the expected incisions within the relaxed skin tension lines where possible. The area thus outlined was incised deep to adipose tissue with a #15 scalpel blade. The skin margins were undermined to an appropriate distance in all directions utilizing iris scissors. Hemostasis was achieved with electrocautery. The flaps were then transposed and carried over into place, one clockwise and the other counterclockwise, and anchored with interrupted buried subcutaneous sutures.
Double O-Z Plasty Text: The defect edges were debeveled with a #15 scalpel blade. Given the location of the defect, shape of the defect and the proximity to free margins a Double O-Z plasty (double transposition flap) was deemed most appropriate. Using a sterile surgical marker, the appropriate transposition flaps were drawn incorporating the defect and placing the expected incisions within the relaxed skin tension lines where possible. The area thus outlined was incised deep to adipose tissue with a #15 scalpel blade. The skin margins were undermined to an appropriate distance in all directions utilizing iris scissors. Hemostasis was achieved with electrocautery. The flaps were then transposed and carried over into place, one clockwise and the other counterclockwise, and anchored with interrupted buried subcutaneous sutures.
V-Y Plasty Text: The defect edges were debeveled with a #15 scalpel blade. Given the location of the defect, shape of the defect and the proximity to free margins an V-Y advancement flap was deemed most appropriate. Using a sterile surgical marker, an appropriate advancement flap was drawn incorporating the defect and placing the expected incisions within the relaxed skin tension lines where possible. The area thus outlined was incised deep to adipose tissue with a #15 scalpel blade. The skin margins were undermined to an appropriate distance in all directions utilizing iris scissors. Following this, the designed flap was advanced and carried over into the primary defect and sutured into place.
H Plasty Text: Given the location of the defect, shape of the defect and the proximity to free margins a H-plasty was deemed most appropriate for repair. Using a sterile surgical marker, the appropriate advancement arms of the H-plasty were drawn incorporating the defect and placing the expected incisions within the relaxed skin tension lines where possible. The area thus outlined was incised deep to adipose tissue with a #15 scalpel blade. The skin margins were undermined to an appropriate distance in all directions utilizing iris scissors.  The opposing advancement arms were then advanced and carried over into place in opposite direction and anchored with interrupted buried subcutaneous sutures.
W Plasty Text: The lesion was extirpated to the level of the fat with a #15 scalpel blade. Given the location of the defect, shape of the defect and the proximity to free margins a W-plasty was deemed most appropriate for repair. Using a sterile surgical marker, the appropriate transposition arms of the W-plasty were drawn incorporating the defect and placing the expected incisions within the relaxed skin tension lines where possible. The area thus outlined was incised deep to adipose tissue with a #15 scalpel blade. The skin margins were undermined to an appropriate distance in all directions utilizing iris scissors. The opposing transposition arms were then transposed and carried over into place in opposite direction and anchored with interrupted buried subcutaneous sutures.
Z Plasty Text: The lesion was extirpated to the level of the fat with a #15 scalpel blade. Given the location of the defect, shape of the defect and the proximity to free margins a Z-plasty was deemed most appropriate for repair. Using a sterile surgical marker, the appropriate transposition arms of the Z-plasty were drawn incorporating the defect and placing the expected incisions within the relaxed skin tension lines where possible. The area thus outlined was incised deep to adipose tissue with a #15 scalpel blade. The skin margins were undermined to an appropriate distance in all directions utilizing iris scissors. The opposing transposition arms were then transposed and carried over into place in opposite direction and anchored with interrupted buried subcutaneous sutures.
Double Z Plasty Text: The lesion was extirpated to the level of the fat with a #15 scalpel blade. Given the location of the defect, shape of the defect and the proximity to free margins a double Z-plasty was deemed most appropriate for repair. Using a sterile surgical marker, the appropriate transposition arms of the double Z-plasty were drawn incorporating the defect and placing the expected incisions within the relaxed skin tension lines where possible. The area thus outlined was incised deep to adipose tissue with a #15 scalpel blade. The skin margins were undermined to an appropriate distance in all directions utilizing iris scissors. The opposing transposition arms were then transposed and carried over into place in opposite direction and anchored with interrupted buried subcutaneous sutures.
Zygomaticofacial Flap Text: Given the location of the defect, shape of the defect and the proximity to free margins a zygomaticofacial flap was deemed most appropriate for repair. Using a sterile surgical marker, the appropriate flap was drawn incorporating the defect and placing the expected incisions within the relaxed skin tension lines where possible. The area thus outlined was incised deep to adipose tissue with a #15 scalpel blade with preservation of a vascular pedicle.  The skin margins were undermined to an appropriate distance in all directions utilizing iris scissors. The flap was then carried over into the defect and anchored with interrupted buried subcutaneous sutures.
Cheek Interpolation Flap Text: A decision was made to reconstruct the defect utilizing an interpolation axial flap and a staged reconstruction.  A telfa template was made of the defect.  This telfa template was then used to outline the Cheek Interpolation flap.  The donor area for the pedicle flap was then injected with anesthesia.  The flap was excised through the skin and subcutaneous tissue down to the layer of the underlying musculature.  The interpolation flap was carefully excised within this deep plane to maintain its blood supply.  The edges of the donor site were undermined.   The donor site was closed in a primary fashion.  The pedicle was then rotated into position and sutured.  Once the tube was sutured into place, adequate blood supply was confirmed with blanching and refill.  The pedicle was then wrapped with xeroform gauze and dressed appropriately with a telfa and gauze bandage to ensure continued blood supply and protect the attached pedicle.
Cheek-To-Nose Interpolation Flap Text: A decision was made to reconstruct the defect utilizing an interpolation axial flap and a staged reconstruction.  A telfa template was made of the defect.  This telfa template was then used to outline the Cheek-To-Nose Interpolation flap.  The donor area for the pedicle flap was then injected with anesthesia.  The flap was excised through the skin and subcutaneous tissue down to the layer of the underlying musculature.  The interpolation flap was carefully excised within this deep plane to maintain its blood supply.  The edges of the donor site were undermined.   The donor site was closed in a primary fashion.  The pedicle was then rotated into position and sutured.  Once the tube was sutured into place, adequate blood supply was confirmed with blanching and refill.  The pedicle was then wrapped with xeroform gauze and dressed appropriately with a telfa and gauze bandage to ensure continued blood supply and protect the attached pedicle.
Interpolation Flap Text: A decision was made to reconstruct the defect utilizing an interpolation axial flap and a staged reconstruction.  A telfa template was made of the defect.  This telfa template was then used to outline the interpolation flap.  The donor area for the pedicle flap was then injected with anesthesia.  The flap was excised through the skin and subcutaneous tissue down to the layer of the underlying musculature.  The interpolation flap was carefully excised within this deep plane to maintain its blood supply.  The edges of the donor site were undermined.   The donor site was closed in a primary fashion.  The pedicle was then rotated into position and sutured.  Once the tube was sutured into place, adequate blood supply was confirmed with blanching and refill.  The pedicle was then wrapped with xeroform gauze and dressed appropriately with a telfa and gauze bandage to ensure continued blood supply and protect the attached pedicle.
Melolabial Interpolation Flap Text: A decision was made to reconstruct the defect utilizing an interpolation axial flap and a staged reconstruction.  A telfa template was made of the defect.  This telfa template was then used to outline the melolabial interpolation flap.  The donor area for the pedicle flap was then injected with anesthesia.  The flap was excised through the skin and subcutaneous tissue down to the layer of the underlying musculature.  The pedicle flap was carefully excised within this deep plane to maintain its blood supply.  The edges of the donor site were undermined.   The donor site was closed in a primary fashion.  The pedicle was then rotated into position and sutured.  Once the tube was sutured into place, adequate blood supply was confirmed with blanching and refill.  The pedicle was then wrapped with xeroform gauze and dressed appropriately with a telfa and gauze bandage to ensure continued blood supply and protect the attached pedicle.
Mastoid Interpolation Flap Text: A decision was made to reconstruct the defect utilizing an interpolation axial flap and a staged reconstruction.  A telfa template was made of the defect.  This telfa template was then used to outline the mastoid interpolation flap.  The donor area for the pedicle flap was then injected with anesthesia.  The flap was excised through the skin and subcutaneous tissue down to the layer of the underlying musculature.  The pedicle flap was carefully excised within this deep plane to maintain its blood supply.  The edges of the donor site were undermined.   The donor site was closed in a primary fashion.  The pedicle was then rotated into position and sutured.  Once the tube was sutured into place, adequate blood supply was confirmed with blanching and refill.  The pedicle was then wrapped with xeroform gauze and dressed appropriately with a telfa and gauze bandage to ensure continued blood supply and protect the attached pedicle.
Posterior Auricular Interpolation Flap Text: A decision was made to reconstruct the defect utilizing an interpolation axial flap and a staged reconstruction.  A telfa template was made of the defect.  This telfa template was then used to outline the posterior auricular interpolation flap.  The donor area for the pedicle flap was then injected with anesthesia.  The flap was excised through the skin and subcutaneous tissue down to the layer of the underlying musculature.  The pedicle flap was carefully excised within this deep plane to maintain its blood supply.  The edges of the donor site were undermined.   The donor site was closed in a primary fashion.  The pedicle was then rotated into position and sutured.  Once the tube was sutured into place, adequate blood supply was confirmed with blanching and refill.  The pedicle was then wrapped with xeroform gauze and dressed appropriately with a telfa and gauze bandage to ensure continued blood supply and protect the attached pedicle.
Paramedian Forehead Flap Text: A decision was made to reconstruct the defect utilizing an interpolation axial flap and a staged reconstruction.  A telfa template was made of the defect.  This telfa template was then used to outline the paramedian forehead pedicle flap.  The donor area for the pedicle flap was then injected with anesthesia.  The flap was excised through the skin and subcutaneous tissue down to the layer of the underlying musculature.  The pedicle flap was carefully excised within this deep plane to maintain its blood supply.  The edges of the donor site were undermined.   The donor site was closed in a primary fashion.  The pedicle was then rotated into position and sutured.  Once the tube was sutured into place, adequate blood supply was confirmed with blanching and refill.  The pedicle was then wrapped with xeroform gauze and dressed appropriately with a telfa and gauze bandage to ensure continued blood supply and protect the attached pedicle.
Abbe Flap (Upper To Lower Lip) Text: The defect of the lower lip was assessed and measured.  Given the location and size of the defect, an Abbe flap was deemed most appropriate. Using a sterile surgical marker, an appropriate Abbe flap was measured and drawn on the upper lip. Local anesthesia was then infiltrated.  A scalpel was then used to incise the upper lip through and through the skin, vermilion, muscle and mucosa, leaving the flap pedicled on the opposite side.  The flap was then rotated and transferred to the lower lip defect.  The flap was then sutured into place with a three layer technique, closing the orbicularis oris muscle layer with subcutaneous buried sutures, followed by a mucosal layer and an epidermal layer.
Abbe Flap (Lower To Upper Lip) Text: The defect of the upper lip was assessed and measured.  Given the location and size of the defect, an Abbe flap was deemed most appropriate. Using a sterile surgical marker, an appropriate Abbe flap was measured and drawn on the lower lip. Local anesthesia was then infiltrated. A scalpel was then used to incise the upper lip through and through the skin, vermilion, muscle and mucosa, leaving the flap pedicled on the opposite side.  The flap was then rotated and transferred to the lower lip defect.  The flap was then sutured into place with a three layer technique, closing the orbicularis oris muscle layer with subcutaneous buried sutures, followed by a mucosal layer and an epidermal layer.
Estlander Flap (Upper To Lower Lip) Text: The defect of the lower lip was assessed and measured.  Given the location and size of the defect, an Estlander flap was deemed most appropriate. Using a sterile surgical marker, an appropriate Estlander flap was measured and drawn on the upper lip. Local anesthesia was then infiltrated. A scalpel was then used to incise the lateral aspect of the flap, through skin, muscle and mucosa, leaving the flap pedicled medially.  The flap was then rotated and positioned to fill the lower lip defect.  The flap was then sutured into place with a three layer technique, closing the orbicularis oris muscle layer with subcutaneous buried sutures, followed by a mucosal layer and an epidermal layer.
Cheiloplasty (Less Than 50%) Text: A decision was made to reconstruct the defect with a  cheiloplasty.  The defect was undermined extensively.  Additional orbicularis oris muscle was excised with a 15 blade scalpel.  The defect was converted into a full thickness wedge, of less than 50% of the vertical height of the lip, to facilite a better cosmetic result.  Small vessels were then tied off with 5-0 monocyrl. The orbicularis oris, superficial fascia, adipose and dermis were then reapproximated.  After the deeper layers were approximated the epidermis was reapproximated with particular care given to realign the vermilion border.
Cheiloplasty (Complex) Text: A decision was made to reconstruct the defect with a  cheiloplasty.  The defect was undermined extensively.  Additional orbicularis oris muscle was excised with a 15 blade scalpel.  The defect was converted into a full thickness wedge to facilite a better cosmetic result.  Small vessels were then tied off with 5-0 monocyrl. The orbicularis oris, superficial fascia, adipose and dermis were then reapproximated.  After the deeper layers were approximated the epidermis was reapproximated with particular care given to realign the vermilion border.
Ear Wedge Repair Text: A wedge excision was completed by carrying down an excision through the full thickness of the ear and cartilage with an inward facing Burow's triangle. The wound was then closed in a layered fashion.
Full Thickness Lip Wedge Repair (Flap) Text: Given the location of the defect and the proximity to free margins a full thickness wedge repair was deemed most appropriate. Using a sterile surgical marker, the appropriate repair was drawn incorporating the defect and placing the expected incisions perpendicular to the vermilion border.  The vermilion border was also meticulously outlined to ensure appropriate reapproximation during the repair.  The area thus outlined was incised through and through with a #15 scalpel blade.  The muscularis and dermis were reaproximated with deep sutures following hemostasis. Care was taken to realign the vermilion border before proceeding with the superficial closure.  Once the vermilion was realigned the superfical and mucosal closure was finished.
Ftsg Text: The defect edges were debeveled with a #15 scalpel blade. Given the location of the defect, shape of the defect and the proximity to free margins a full thickness skin graft was deemed most appropriate. Using a sterile surgical marker, the primary defect shape was transferred to the donor site. The area thus outlined was incised deep to adipose tissue with a #15 scalpel blade.  The harvested graft was then trimmed of adipose tissue until only dermis and epidermis was left.  The skin margins of the secondary defect were undermined to an appropriate distance in all directions utilizing iris scissors.  The secondary defect was closed with interrupted buried subcutaneous sutures.  The skin edges were then re-apposed with running  sutures.  The skin graft was then placed in the primary defect and oriented appropriately.
Split-Thickness Skin Graft Text: The defect edges were debeveled with a #15 scalpel blade. Given the location of the defect, shape of the defect and the proximity to free margins a split thickness skin graft was deemed most appropriate. Using a sterile surgical marker, the primary defect shape was transferred to the donor site. The split thickness graft was then harvested.  The skin graft was then placed in the primary defect and oriented appropriately.
Pinch Graft Text: The defect edges were debeveled with a #15 scalpel blade. Given the location of the defect, shape of the defect and the proximity to free margins a pinch graft was deemed most appropriate. Using a sterile surgical marker, the primary defect shape was transferred to the donor site. The area thus outlined was incised deep to adipose tissue with a #15 scalpel blade.  The harvested graft was then trimmed of adipose tissue until only dermis and epidermis was left. The skin margins of the secondary defect were undermined to an appropriate distance in all directions utilizing iris scissors.  The secondary defect was closed with interrupted buried subcutaneous sutures.  The skin edges were then re-apposed with running  sutures.  The skin graft was then placed in the primary defect and oriented appropriately.
Burow's Graft Text: The defect edges were debeveled with a #15 scalpel blade. Given the location of the defect, shape of the defect, the proximity to free margins and the presence of a standing cone deformity a Burow's skin graft was deemed most appropriate. The standing cone was removed and this tissue was then trimmed to the shape of the primary defect. The adipose tissue was also removed until only dermis and epidermis were left.  The skin margins of the secondary defect were undermined to an appropriate distance in all directions utilizing iris scissors.  The secondary defect was closed with interrupted buried subcutaneous sutures.  The skin edges were then re-apposed with running  sutures.  The skin graft was then placed in the primary defect and oriented appropriately.
Cartilage Graft Text: The defect edges were debeveled with a #15 scalpel blade. Given the location of the defect, shape of the defect, the fact the defect involved a full thickness cartilage defect a cartilage graft was deemed most appropriate.  An appropriate donor site was identified, cleansed, and anesthetized. The cartilage graft was then harvested and transferred to the recipient site, oriented appropriately and then sutured into place.  The secondary defect was then repaired using a primary closure.
Composite Graft Text: The defect edges were debeveled with a #15 scalpel blade. Given the location of the defect, shape of the defect, the proximity to free margins and the fact the defect was full thickness a composite graft was deemed most appropriate.  The defect was outline and then transferred to the donor site.  A full thickness graft was then excised from the donor site. The graft was then placed in the primary defect, oriented appropriately and then sutured into place.  The secondary defect was then repaired using a primary closure.
Epidermal Autograft Text: The defect edges were debeveled with a #15 scalpel blade. Given the location of the defect, shape of the defect and the proximity to free margins an epidermal autograft was deemed most appropriate. Using a sterile surgical marker, the primary defect shape was transferred to the donor site. The epidermal graft was then harvested.  The skin graft was then placed in the primary defect and oriented appropriately.
Dermal Autograft Text: The defect edges were debeveled with a #15 scalpel blade. Given the location of the defect, shape of the defect and the proximity to free margins a dermal autograft was deemed most appropriate. Using a sterile surgical marker, the primary defect shape was transferred to the donor site. The area thus outlined was incised deep to adipose tissue with a #15 scalpel blade.  The harvested graft was then trimmed of adipose and epidermal tissue until only dermis was left.  The skin graft was then placed in the primary defect and oriented appropriately.
Skin Substitute Text: The defect edges were debeveled with a #15 scalpel blade. Given the location of the defect, shape of the defect and the proximity to free margins a skin substitute graft was deemed most appropriate.  The graft material was trimmed to fit the size of the defect. The graft was then placed in the primary defect and oriented appropriately.
Tissue Cultured Epidermal Autograft Text: The defect edges were debeveled with a #15 scalpel blade. Given the location of the defect, shape of the defect and the proximity to free margins a tissue cultured epidermal autograft was deemed most appropriate.  The graft was then trimmed to fit the size of the defect.  The graft was then placed in the primary defect and oriented appropriately.
Xenograft Text: The defect edges were debeveled with a #15 scalpel blade. Given the location of the defect, shape of the defect and the proximity to free margins a xenograft was deemed most appropriate.  The graft was then trimmed to fit the size of the defect.  The graft was then placed in the primary defect and oriented appropriately.
Purse String (Simple) Text: Given the location of the defect and the characteristics of the surrounding skin a purse string closure was deemed most appropriate.  Undermining was performed circumferentially around the surgical defect.  A purse string suture was then placed and tightened.
Purse String (Intermediate) Text: Given the location of the defect and the characteristics of the surrounding skin a purse string intermediate closure was deemed most appropriate.  Undermining was performed circumferentially around the surgical defect.  A purse string suture was then placed and tightened.
Partial Purse String (Simple) Text: Given the location of the defect and the characteristics of the surrounding skin a simple purse string closure was deemed most appropriate.  Undermining was performed circumferentially around the surgical defect.  A purse string suture was then placed and tightened. Wound tension only allowed a partial closure of the circular defect.
Partial Purse String (Intermediate) Text: Given the location of the defect and the characteristics of the surrounding skin an intermediate purse string closure was deemed most appropriate.  Undermining was performed circumferentially around the surgical defect.  A purse string suture was then placed and tightened. Wound tension only allowed a partial closure of the circular defect.
Localized Dermabrasion With Wire Brush Text: The patient was draped in routine manner.  Localized dermabrasion using 3 x 17 mm wire brush was performed in routine manner to papillary dermis. This spot dermabrasion is being performed to complete skin cancer reconstruction. It also will eliminate the other sun damaged precancerous cells that are known to be part of the regional effect of a lifetime's worth of sun exposure. This localized dermabrasion is therapeutic and should not be considered cosmetic in any regard.
Localized Dermabrasion With Sand Papertext: The patient was draped in routine manner.  Localized dermabrasion using sterile sand paper was performed in routine manner to papillary dermis. This spot dermabrasion is being performed to complete skin cancer reconstruction. It also will eliminate the other sun damaged precancerous cells that are known to be part of the regional effect of a lifetime's worth of sun exposure. This localized dermabrasion is therapeutic and should not be considered cosmetic in any regard.
Localized Dermabrasion With 15 Blade Text: The patient was draped in routine manner.  Localized dermabrasion using a 15 blade was performed in routine manner to papillary dermis. This spot dermabrasion is being performed to complete skin cancer reconstruction. It also will eliminate the other sun damaged precancerous cells that are known to be part of the regional effect of a lifetime's worth of sun exposure. This localized dermabrasion is therapeutic and should not be considered cosmetic in any regard.
Tarsorrhaphy Text: A tarsorrhaphy was performed using Frost sutures.
Intermediate Repair And Flap Additional Text (Will Appearing After The Standard Complex Repair Text): The intermediate repair was not sufficient to completely close the primary defect. The remaining additional defect was repaired with the flap mentioned below.
Intermediate Repair And Graft Additional Text (Will Appearing After The Standard Complex Repair Text): The intermediate repair was not sufficient to completely close the primary defect. The remaining additional defect was repaired with the graft mentioned below.
Complex Repair And Flap Additional Text (Will Appearing After The Standard Complex Repair Text): The complex repair was not sufficient to completely close the primary defect. The remaining additional defect was repaired with the flap mentioned below.
Complex Repair And Graft Additional Text (Will Appearing After The Standard Complex Repair Text): The complex repair was not sufficient to completely close the primary defect. The remaining additional defect was repaired with the graft mentioned below.
Eyelid Full Thickness Repair - 59766: The eyelid defect was full thickness which required a wedge repair of the eyelid. Special care was taken to ensure that the eyelid margin was realligned when placing sutures.
Eyelid Partial Thickness Repair - 73166: The eyelid defect was partial thickness which required a wedge repair of the eyelid. Special care was taken to ensure that the eyelid margin was realligned when placing sutures.
Manual Repair Warning Statement: We plan on removing the manually selected variable below in favor of our much easier automatic structured text blocks found in the previous tab. We decided to do this to help make the flow better and give you the full power of structured data. Manual selection is never going to be ideal in our platform and I would encourage you to avoid using manual selection from this point on, especially since I will be sunsetting this feature. It is important that you do one of two things with the customized text below. First, you can save all of the text in a word file so you can have it for future reference. Second, transfer the text to the appropriate area in the Library tab. Lastly, if there is a flap or graft type which we do not have you need to let us know right away so I can add it in before the variable is hidden. No need to panic, we plan to give you roughly 6 months to make the change.
Same Histology In Subsequent Stages Text: The pattern and morphology of the tumor is as described in the first stage.
No Residual Tumor Seen Histology Text: There were no malignant cells seen in the sections examined.
Inflammation Suggestive Of Cancer Camouflage Histology Text: There was a dense lymphocytic infiltrate which prevented adequate histologic evaluation of adjacent structures.
Bcc Histology Text: There were numerous aggregates of basaloid cells.
Bcc Infiltrative Histology Text: There were numerous aggregates of basaloid cells demonstrating an infiltrative pattern.
Mart-1 - Positive Histology Text: MART-1 staining demonstrates areas of higher density and clustering of melanocytes with Pagetoid spread upwards within the epidermis. The surgical margins are positive for tumor cells.
Mart-1 - Negative Histology Text: MART-1 staining demonstrates a normal density and pattern of melanocytes along the dermal-epidermal junction. The surgical margins are negative for tumor cells.
Information: Selecting Yes will display possible errors in your note based on the variables you have selected. This validation is only offered as a suggestion for you. PLEASE NOTE THAT THE VALIDATION TEXT WILL BE REMOVED WHEN YOU FINALIZE YOUR NOTE. IF YOU WANT TO FAX A PRELIMINARY NOTE YOU WILL NEED TO TOGGLE THIS TO 'NO' IF YOU DO NOT WANT IT IN YOUR FAXED NOTE.

## 2024-03-14 NOTE — PROCEDURE: COUNSELING
Detail Level: Simple
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  This will be discussed further and scheduling planned at his next visit.

## 2024-03-28 ENCOUNTER — APPOINTMENT (OUTPATIENT)
Dept: URBAN - METROPOLITAN AREA CLINIC 255 | Age: 89
Setting detail: DERMATOLOGY
End: 2024-04-01

## 2024-03-28 DIAGNOSIS — L98419 CHRONIC ULCER OF OTHER SPECIFIED SITES: ICD-10-CM

## 2024-03-28 DIAGNOSIS — L98429 CHRONIC ULCER OF OTHER SPECIFIED SITES: ICD-10-CM

## 2024-03-28 DIAGNOSIS — L71.1 RHINOPHYMA: ICD-10-CM

## 2024-03-28 PROBLEM — L98.499 NON-PRESSURE CHRONIC ULCER OF SKIN OF OTHER SITES WITH UNSPECIFIED SEVERITY: Status: ACTIVE | Noted: 2024-03-28

## 2024-03-28 PROCEDURE — 99214 OFFICE O/P EST MOD 30 MIN: CPT

## 2024-03-28 PROCEDURE — OTHER ULCER EVALUATION: OTHER

## 2024-03-28 PROCEDURE — OTHER DIAGNOSIS COMMENT: OTHER

## 2024-03-28 PROCEDURE — OTHER COUNSELING: OTHER

## 2024-03-28 PROCEDURE — OTHER MIPS QUALITY: OTHER

## 2024-03-28 PROCEDURE — OTHER DEFER: OTHER

## 2024-03-28 ASSESSMENT — LOCATION DETAILED DESCRIPTION DERM
LOCATION DETAILED: RIGHT NASAL ALA
LOCATION DETAILED: NASAL SUPRATIP

## 2024-03-28 ASSESSMENT — LOCATION SIMPLE DESCRIPTION DERM
LOCATION SIMPLE: RIGHT NOSE
LOCATION SIMPLE: NOSE

## 2024-03-28 ASSESSMENT — LOCATION ZONE DERM: LOCATION ZONE: NOSE

## 2024-03-28 NOTE — PROCEDURE: ULCER EVALUATION
Size Of Lesion In Cm (Optional): 0
Instructions (Optional): Physical Examination:\\nEschar: none\\nGranulation Tissue: healed\\nRe-Epithelialization: recently re-epithelialized\\nDrainage: none\\nSurrounding Edema: absent\\nPain to palpation: 0 / 10\\nOdor: none \\nSurrounding Dermatitis: absent\\n\\nAssessment:\\nHealed (recently re-epithelialized)\\nNo signs / symptoms of infection\\n\\nPlan:\\nWound cleaned with H2O2\\nConclude wound care.\\nRTC for routine skin checks
Detail Level: Detailed

## 2024-03-28 NOTE — PROCEDURE: DEFER
Other Procedure: Wire loop electrosurgery
X Size Of Lesion In Cm (Optional): 0
Detail Level: Detailed
Introduction Text (Please End With A Colon): The following procedure was deferred:

## 2024-03-28 NOTE — PROCEDURE: DIAGNOSIS COMMENT
Detail Level: Simple
Render Risk Assessment In Note?: no
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

## 2024-03-28 NOTE — PROCEDURE: COUNSELING
Detail Level: Simple
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 Patient will determine follow up appointment based upon social conflicts.
Detail Level: Detailed

## 2024-07-17 ENCOUNTER — APPOINTMENT (OUTPATIENT)
Dept: URBAN - METROPOLITAN AREA CLINIC 256 | Age: 89
Setting detail: DERMATOLOGY
End: 2024-07-17

## 2024-07-17 VITALS — WEIGHT: 207 LBS | HEIGHT: 72 IN

## 2024-07-17 DIAGNOSIS — Z85.828 PERSONAL HISTORY OF OTHER MALIGNANT NEOPLASM OF SKIN: ICD-10-CM

## 2024-07-17 DIAGNOSIS — D485 NEOPLASM OF UNCERTAIN BEHAVIOR OF SKIN: ICD-10-CM

## 2024-07-17 DIAGNOSIS — L71.1 RHINOPHYMA: ICD-10-CM

## 2024-07-17 PROBLEM — D48.5 NEOPLASM OF UNCERTAIN BEHAVIOR OF SKIN: Status: ACTIVE | Noted: 2024-07-17

## 2024-07-17 PROCEDURE — 11102 TANGNTL BX SKIN SINGLE LES: CPT

## 2024-07-17 PROCEDURE — OTHER ADDITIONAL NOTES: OTHER

## 2024-07-17 PROCEDURE — OTHER BIOPSY BY SHAVE METHOD: OTHER

## 2024-07-17 PROCEDURE — OTHER PHOTO-DOCUMENTATION: OTHER

## 2024-07-17 PROCEDURE — OTHER COUNSELING: OTHER

## 2024-07-17 PROCEDURE — OTHER MIPS QUALITY: OTHER

## 2024-07-17 PROCEDURE — 99212 OFFICE O/P EST SF 10 MIN: CPT | Mod: 25

## 2024-07-17 ASSESSMENT — LOCATION DETAILED DESCRIPTION DERM: LOCATION DETAILED: SUPERIOR MID FOREHEAD

## 2024-07-17 ASSESSMENT — LOCATION ZONE DERM: LOCATION ZONE: FACE

## 2024-07-17 ASSESSMENT — LOCATION SIMPLE DESCRIPTION DERM: LOCATION SIMPLE: SUPERIOR FOREHEAD

## 2024-07-17 NOTE — PROCEDURE: BIOPSY BY SHAVE METHOD
Detail Level: Detailed
Depth Of Biopsy: dermis
Was A Bandage Applied: Yes
Size Of Lesion In Cm: 0.8
X Size Of Lesion In Cm: 0
Biopsy Type: H and E
Biopsy Method: Personna blade
Anesthesia Type: 1% lidocaine with epinephrine
Anesthesia Volume In Cc: 0.5
Hemostasis: Hot cautery
Wound Care: Petrolatum
Dressing: bandage
Destruction After The Procedure: No
Type Of Destruction Used: Curettage
Curettage Text: The wound bed was treated with curettage after the biopsy was performed.
Cryotherapy Text: The wound bed was treated with cryotherapy after the biopsy was performed.
Electrodesiccation Text: The wound bed was treated with electrodesiccation after the biopsy was performed.
Electrodesiccation And Curettage Text: The wound bed was treated with electrodesiccation and curettage after the biopsy was performed.
Silver Nitrate Text: The wound bed was treated with silver nitrate after the biopsy was performed.
Lab: [id number]
Path Notes (To The Dermatopathologist): Please check margins
Consent: Written consent was obtained and risks were reviewed including but not limited to scarring, infection, bleeding, scabbing, incomplete removal, nerve damage and allergy to anesthesia.
Post-Care Instructions: I reviewed with the patient in detail post-care instructions. Patient is to keep the biopsy site dry overnight, and then apply bacitracin twice daily until healed. Patient may apply hydrogen peroxide soaks to remove any crusting.
Notification Instructions: Patient will be notified of biopsy results. However, patient instructed to call the office if not contacted within 2 weeks.
Billing Type: Third-Party Bill
Information: Selecting Yes will display possible errors in your note based on the variables you have selected. This validation is only offered as a suggestion for you. PLEASE NOTE THAT THE VALIDATION TEXT WILL BE REMOVED WHEN YOU FINALIZE YOUR NOTE. IF YOU WANT TO FAX A PRELIMINARY NOTE YOU WILL NEED TO TOGGLE THIS TO 'NO' IF YOU DO NOT WANT IT IN YOUR FAXED NOTE.

## 2024-07-17 NOTE — PROCEDURE: ADDITIONAL NOTES
Additional Notes: - Patient was scheduled for treatment of this condition with Dr. Pardo, but the appointment had to be cancelled. He states he was never called back to reschedule. He is wondering if this needs to be treated due to his age.
Detail Level: Simple
Render Risk Assessment In Note?: no

## 2024-07-26 ENCOUNTER — APPOINTMENT (OUTPATIENT)
Dept: URBAN - METROPOLITAN AREA CLINIC 256 | Age: 89
Setting detail: DERMATOLOGY
End: 2024-07-28

## 2024-07-26 PROBLEM — C44.319 BASAL CELL CARCINOMA OF SKIN OF OTHER PARTS OF FACE: Status: ACTIVE | Noted: 2024-07-26

## 2024-07-26 PROCEDURE — OTHER COUNSELING: OTHER

## 2024-07-26 PROCEDURE — OTHER MIPS QUALITY: OTHER

## 2024-07-26 PROCEDURE — OTHER CURETTAGE AND DESTRUCTION: OTHER

## 2024-07-26 PROCEDURE — 17281 DSTR MAL LS F/E/E/N/L/M .6-1: CPT

## 2024-07-26 PROCEDURE — OTHER ADDITIONAL NOTES: OTHER

## 2024-07-26 NOTE — PROCEDURE: CURETTAGE AND DESTRUCTION
Detail Level: Detailed
Biopsy Photograph Reviewed: Yes
Number Of Curettages: 2
Size Of Lesion In Cm: 0.8
Add Intralesional Injection: No
Concentration (Mg/Ml Or Millions Of Plaque Forming Units/Cc): 0.01
Total Volume (Ccs): 1
Anesthesia Type: 1% lidocaine with epinephrine
Cautery Type: hot cautery
What Was Performed First?: Curettage
Final Size Statement: The size of the lesion after curettage was
Additional Information: (Optional): The wound was cleaned, and a pressure dressing was applied.  The patient received detailed post-op instructions.
Consent was obtained from the patient. The risks, benefits and alternatives to therapy were discussed in detail. Specifically, the risks of infection, scarring, bleeding, prolonged wound healing, nerve injury, incomplete removal, allergy to anesthesia and recurrence were addressed. Alternatives to ED&C, such as: surgical removal and XRT were also discussed.  Prior to the procedure, the treatment site was clearly identified and confirmed by the patient. All components of Universal Protocol/PAUSE Rule completed.
Post-Care Instructions: I reviewed with the patient in detail post-care instructions. Patient is to keep the area dry for 48 hours, and not to engage in any swimming until the area is healed. Should the patient develop any fevers, chills, bleeding, severe pain patient will contact the office immediately.
Bill As A Line Item Or As Units: Line Item

## 2025-05-06 ENCOUNTER — APPOINTMENT (OUTPATIENT)
Dept: URBAN - METROPOLITAN AREA CLINIC 256 | Age: OVER 89
Setting detail: DERMATOLOGY
End: 2025-05-06

## 2025-05-06 VITALS — HEIGHT: 72 IN | WEIGHT: 205 LBS

## 2025-05-06 DIAGNOSIS — L82.1 OTHER SEBORRHEIC KERATOSIS: ICD-10-CM

## 2025-05-06 DIAGNOSIS — L57.8 OTHER SKIN CHANGES DUE TO CHRONIC EXPOSURE TO NONIONIZING RADIATION: ICD-10-CM

## 2025-05-06 DIAGNOSIS — Z85.828 PERSONAL HISTORY OF OTHER MALIGNANT NEOPLASM OF SKIN: ICD-10-CM

## 2025-05-06 DIAGNOSIS — D49.2 NEOPLASM OF UNSPECIFIED BEHAVIOR OF BONE, SOFT TISSUE, AND SKIN: ICD-10-CM

## 2025-05-06 PROCEDURE — OTHER COUNSELING: OTHER

## 2025-05-06 PROCEDURE — 11102 TANGNTL BX SKIN SINGLE LES: CPT

## 2025-05-06 PROCEDURE — 99213 OFFICE O/P EST LOW 20 MIN: CPT | Mod: 25

## 2025-05-06 PROCEDURE — OTHER MIPS QUALITY: OTHER

## 2025-05-06 PROCEDURE — OTHER PHOTO-DOCUMENTATION: OTHER

## 2025-05-06 PROCEDURE — OTHER BIOPSY BY SHAVE METHOD: OTHER

## 2025-05-06 ASSESSMENT — LOCATION DETAILED DESCRIPTION DERM
LOCATION DETAILED: LEFT CENTRAL FRONTAL SCALP
LOCATION DETAILED: LEFT MID TEMPLE

## 2025-05-06 ASSESSMENT — LOCATION ZONE DERM
LOCATION ZONE: FACE
LOCATION ZONE: SCALP

## 2025-05-06 ASSESSMENT — LOCATION SIMPLE DESCRIPTION DERM
LOCATION SIMPLE: LEFT TEMPLE
LOCATION SIMPLE: LEFT SCALP

## 2025-05-13 ENCOUNTER — APPOINTMENT (OUTPATIENT)
Dept: URBAN - METROPOLITAN AREA CLINIC 256 | Age: OVER 89
Setting detail: DERMATOLOGY
End: 2025-05-13

## 2025-05-13 PROBLEM — C44.41 BASAL CELL CARCINOMA OF SKIN OF SCALP AND NECK: Status: ACTIVE | Noted: 2025-05-13

## 2025-05-13 PROCEDURE — OTHER MIPS QUALITY: OTHER

## 2025-05-13 PROCEDURE — OTHER COUNSELING: OTHER

## 2025-05-13 PROCEDURE — 17271 DSTR MAL LES S/N/H/F/G 0.6-1: CPT

## 2025-05-13 PROCEDURE — OTHER CURETTAGE AND DESTRUCTION: OTHER

## (undated) DEVICE — CATH FOLEY 12FR 5ML SILICONE LUBRI-SIL 175812

## (undated) DEVICE — CATH FOLYSIL 16FR 15ML AA6116

## (undated) DEVICE — SU VICRYL 4-0 RB-1 27" J304

## (undated) DEVICE — LINEN TOWEL PACK X6 WHITE 5487

## (undated) DEVICE — ESU GROUND PAD ADULT W/CORD E7507

## (undated) DEVICE — VESSEL LOOPS YELLOW MAXI 31145694

## (undated) DEVICE — STPL SKIN 35W 059037

## (undated) DEVICE — PREP SKIN SCRUB TRAY 4461A

## (undated) DEVICE — SU VICRYL 3-0 SH 27" J316H

## (undated) DEVICE — JELLY LUBRICATING SURGILUBE 2OZ TUBE

## (undated) DEVICE — LINEN GOWN XLG 5407

## (undated) DEVICE — DRAPE SHEET MED 44X70" 9355

## (undated) DEVICE — SOL NACL 0.9% INJ 1000ML BAG 2B1324X

## (undated) DEVICE — CATH PLUG W/CAP 000076

## (undated) DEVICE — TUBING IRRIG CYSTO/BLADDER SET 81" LF 2C4040

## (undated) DEVICE — SUCTION MANIFOLD DORNOCH ULTRA CART UL-CL500

## (undated) DEVICE — DRAPE LEGGINGS CLEAR 8430

## (undated) DEVICE — SUTURE BOOTS 051003PBX

## (undated) DEVICE — SYR 10ML LL W/O NDL 302995

## (undated) DEVICE — DRSG GAUZE 4X4" TRAY 6939

## (undated) DEVICE — DRAPE POUCH INSTRUMENT 1018

## (undated) DEVICE — SYR 30ML LL W/O NDL 302832

## (undated) DEVICE — SYR BULB IRRIG 50ML LATEX FREE 0035280

## (undated) DEVICE — SU SILK 3-0 SH 30" K832H

## (undated) DEVICE — DRAPE GYN/UROLOGY FLUID POUCH TUR 29455

## (undated) DEVICE — SU VICRYL 0 UR-6 27" J603H

## (undated) DEVICE — DRAPE MAYO STAND 23X54 8337

## (undated) DEVICE — ENDO SEAL BX PORT BPS-A

## (undated) DEVICE — BAG URINARY DRAIN LUBRISIL IC 4000ML LF 253509A

## (undated) DEVICE — BLADE CLIPPER SGL USE 9680

## (undated) DEVICE — PREP CHLORAPREP 26ML TINTED ORANGE  260815

## (undated) DEVICE — SOL WATER IRRIG 1000ML BOTTLE 2F7114

## (undated) DEVICE — SU DERMABOND ADVANCED .7ML DNX12

## (undated) DEVICE — PITCHER STERILE 1000ML  SSK9004A

## (undated) DEVICE — Device

## (undated) DEVICE — TUBING SUCTION 10'X3/16" N510

## (undated) DEVICE — SU MONOCRYL 4-0 PS-2 27" UND Y426H

## (undated) DEVICE — PREP POVIDONE IODINE SOLUTION 10% 120ML

## (undated) DEVICE — LINEN TOWEL PACK X5 5464

## (undated) DEVICE — CONNECTOR WATER VALVE PERFUSION PACK STR 020272801

## (undated) DEVICE — SPONGE KITTNER 30-101

## (undated) DEVICE — PREP POVIDONE IODINE SCRUB 7.5% 120ML

## (undated) DEVICE — SU SILK 2-0 TIE 12X30" A305H

## (undated) DEVICE — PANTIES MESH LG/XLG 2PK 706M2

## (undated) DEVICE — PAD CHUX UNDERPAD 23X24" 7136

## (undated) DEVICE — NEO SPHINCTER DEACTIVATION PACK 72400095

## (undated) DEVICE — SU PDS II 2-0 SH 27" Z317H

## (undated) RX ORDER — ACETAMINOPHEN 325 MG/1
TABLET ORAL
Status: DISPENSED
Start: 2017-12-21

## (undated) RX ORDER — PROPOFOL 10 MG/ML
INJECTION, EMULSION INTRAVENOUS
Status: DISPENSED
Start: 2017-12-21

## (undated) RX ORDER — BACITRACIN 500 [USP'U]/G
OINTMENT OPHTHALMIC
Status: DISPENSED
Start: 2017-12-21

## (undated) RX ORDER — GABAPENTIN 100 MG/1
CAPSULE ORAL
Status: DISPENSED
Start: 2017-12-21

## (undated) RX ORDER — EPHEDRINE SULFATE 50 MG/ML
INJECTION, SOLUTION INTRAMUSCULAR; INTRAVENOUS; SUBCUTANEOUS
Status: DISPENSED
Start: 2018-03-08

## (undated) RX ORDER — FENTANYL CITRATE 50 UG/ML
INJECTION, SOLUTION INTRAMUSCULAR; INTRAVENOUS
Status: DISPENSED
Start: 2018-03-08

## (undated) RX ORDER — FENTANYL CITRATE 50 UG/ML
INJECTION, SOLUTION INTRAMUSCULAR; INTRAVENOUS
Status: DISPENSED
Start: 2017-12-21

## (undated) RX ORDER — GLYCOPYRROLATE 0.2 MG/ML
INJECTION, SOLUTION INTRAMUSCULAR; INTRAVENOUS
Status: DISPENSED
Start: 2018-03-08

## (undated) RX ORDER — PROPOFOL 10 MG/ML
INJECTION, EMULSION INTRAVENOUS
Status: DISPENSED
Start: 2018-03-08

## (undated) RX ORDER — GLYCOPYRROLATE 0.2 MG/ML
INJECTION, SOLUTION INTRAMUSCULAR; INTRAVENOUS
Status: DISPENSED
Start: 2017-12-21

## (undated) RX ORDER — SODIUM CHLORIDE 9 MG/ML
INJECTION, SOLUTION INTRAVENOUS
Status: DISPENSED
Start: 2017-12-21

## (undated) RX ORDER — PHENYLEPHRINE HCL IN 0.9% NACL 1 MG/10 ML
SYRINGE (ML) INTRAVENOUS
Status: DISPENSED
Start: 2018-03-08

## (undated) RX ORDER — ONDANSETRON 2 MG/ML
INJECTION INTRAMUSCULAR; INTRAVENOUS
Status: DISPENSED
Start: 2017-12-21

## (undated) RX ORDER — ONDANSETRON 2 MG/ML
INJECTION INTRAMUSCULAR; INTRAVENOUS
Status: DISPENSED
Start: 2018-03-08

## (undated) RX ORDER — LIDOCAINE HYDROCHLORIDE 20 MG/ML
INJECTION, SOLUTION EPIDURAL; INFILTRATION; INTRACAUDAL; PERINEURAL
Status: DISPENSED
Start: 2017-12-21

## (undated) RX ORDER — BACITRACIN 500 [USP'U]/G
OINTMENT OPHTHALMIC
Status: DISPENSED
Start: 2018-03-08